# Patient Record
Sex: MALE | Race: WHITE | URBAN - METROPOLITAN AREA
[De-identification: names, ages, dates, MRNs, and addresses within clinical notes are randomized per-mention and may not be internally consistent; named-entity substitution may affect disease eponyms.]

---

## 2017-01-22 ENCOUNTER — EMERGENCY (EMERGENCY)
Facility: HOSPITAL | Age: 22
LOS: 0 days | Discharge: HOME | End: 2017-01-23
Admitting: FAMILY MEDICINE
Payer: COMMERCIAL

## 2017-01-22 DIAGNOSIS — M25.541 PAIN IN JOINTS OF RIGHT HAND: ICD-10-CM
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
## 2017-01-22 DIAGNOSIS — S62.636A DISPLACED FRACTURE OF DISTAL PHALANX OF RIGHT LITTLE FINGER, INITIAL ENCOUNTER FOR CLOSED FRACTURE: ICD-10-CM

## 2017-01-22 PROCEDURE — 73090 X-RAY EXAM OF FOREARM: CPT | Mod: 26,RT

## 2017-01-22 PROCEDURE — 73130 X-RAY EXAM OF HAND: CPT | Mod: 26,RT

## 2017-01-22 PROCEDURE — 99284 EMERGENCY DEPT VISIT MOD MDM: CPT | Mod: 25

## 2017-01-22 PROCEDURE — 29125 APPL SHORT ARM SPLINT STATIC: CPT | Mod: RT

## 2017-01-29 ENCOUNTER — EMERGENCY (EMERGENCY)
Facility: HOSPITAL | Age: 22
LOS: 1 days | Discharge: HOME | End: 2017-01-29
Attending: EMERGENCY MEDICINE | Admitting: EMERGENCY MEDICINE
Payer: COMMERCIAL

## 2017-01-29 VITALS
SYSTOLIC BLOOD PRESSURE: 105 MMHG | TEMPERATURE: 98 F | RESPIRATION RATE: 18 BRPM | HEART RATE: 94 BPM | OXYGEN SATURATION: 100 % | DIASTOLIC BLOOD PRESSURE: 63 MMHG

## 2017-01-29 PROCEDURE — 73130 X-RAY EXAM OF HAND: CPT | Mod: 26,RT

## 2017-01-29 PROCEDURE — 29125 APPL SHORT ARM SPLINT STATIC: CPT | Mod: RT

## 2017-01-29 PROCEDURE — 99283 EMERGENCY DEPT VISIT LOW MDM: CPT | Mod: 25

## 2017-01-29 RX ORDER — IBUPROFEN 200 MG
600 TABLET ORAL ONCE
Qty: 0 | Refills: 0 | Status: COMPLETED | OUTPATIENT
Start: 2017-01-29 | End: 2017-01-29

## 2017-01-29 RX ORDER — FAMOTIDINE 10 MG/ML
1 INJECTION INTRAVENOUS
Qty: 20 | Refills: 0 | OUTPATIENT
Start: 2017-01-29

## 2017-01-29 NOTE — ED PROVIDER NOTE - OBJECTIVE STATEMENT
22 yo pt with pain and numbness to right hand.  pt with injury to right hand and found to have possible fracture.  Pt placed in cast by Dr. Jenkins.  pt now having increased pain right hand and numbness to right 4th and 5th digit.  Pt states had MRI that showed no fracture, but orthopedic doctor wanted to keep cast on.  Nothing makes symptoms better.

## 2017-01-29 NOTE — ED ADULT NURSE NOTE - OBJECTIVE STATEMENT
pt received in results waiting. pt is awake alert oriented following commands and speaking coherently. pt presents to er complaining of right hand pain. pt received in cast by MD boone. pt has no fracture(mri done) but patient still complaining of pain. no numbness, no deformity. cap refill good. resp even and unlabored. in nad. ortho to see patient. pt injury due to punching wall x 1 week ago

## 2017-01-29 NOTE — ED PROVIDER NOTE - PROGRESS NOTE DETAILS
patient seen by ortho resident Dr. Horne, cast removed, ulnar gutter splint applied by Dr. Horne as per Dr. Todd instructions. Patient feeling better, will dc home

## 2017-01-29 NOTE — CONSULT NOTE ADULT - SUBJECTIVE AND OBJECTIVE BOX
Pt is a R hand dominant 21M presents with tight R ulnar gutter cast. Pt denies numbness tingling paresthesias in affected limb. Pt punched door 1 week ago. XR and MRI negative for fx. Casted by Dr. Todd for comfort d/t complaints of extreme pain.    PMHx: anxiety, depression, ADHD  PSHx: denies  Allergies: bees  Imaging: R hand XR Negative for fx or dislocation    PE:  Gen: NAD, AAOx3  RUE: Cast removed, Skin intact, + TTP over 4th proximal phalanx, SILT C5-T1, motor intact, cap refill brisk, compartments soft, no pain with passive stretch of digits    Procedure: cast removed w/o complication. splinted in ulnar gutter splint in intrinsic plus, tolerated procedure well, n/v status unchanged post splint

## 2017-01-29 NOTE — ED PROVIDER NOTE - MEDICAL DECISION MAKING DETAILS
xrays do not any new fractures,patient seen by ortho resident, case removed, splint applied,. will dc home

## 2017-01-29 NOTE — CONSULT NOTE ADULT - ASSESSMENT
21M w R hand pain and tight cast per pt. Cast removed and splint applied.    analgesia  NWB RUE  Splint for comfort  Keep splint c/d/i  Ortho stable for DC  F/U Dr. Todd as within 1 week  D/w Dr. Todd- agrees as above

## 2017-02-02 DIAGNOSIS — M79.641 PAIN IN RIGHT HAND: ICD-10-CM

## 2017-06-13 ENCOUNTER — EMERGENCY (EMERGENCY)
Facility: HOSPITAL | Age: 22
LOS: 0 days | Discharge: HOME | End: 2017-06-14
Attending: EMERGENCY MEDICINE | Admitting: EMERGENCY MEDICINE
Payer: COMMERCIAL

## 2017-06-13 VITALS — HEIGHT: 67 IN | WEIGHT: 149.91 LBS

## 2017-06-13 PROCEDURE — 71100 X-RAY EXAM RIBS UNI 2 VIEWS: CPT | Mod: 26,RT

## 2017-06-13 PROCEDURE — 99284 EMERGENCY DEPT VISIT MOD MDM: CPT

## 2017-06-13 PROCEDURE — 71020: CPT | Mod: 26

## 2017-06-13 PROCEDURE — 70450 CT HEAD/BRAIN W/O DYE: CPT | Mod: 26

## 2017-06-13 PROCEDURE — 73010 X-RAY EXAM OF SHOULDER BLADE: CPT | Mod: 26,RT

## 2017-06-13 PROCEDURE — 73060 X-RAY EXAM OF HUMERUS: CPT | Mod: 26,RT

## 2017-06-13 RX ORDER — MORPHINE SULFATE 50 MG/1
4 CAPSULE, EXTENDED RELEASE ORAL ONCE
Qty: 0 | Refills: 0 | Status: DISCONTINUED | OUTPATIENT
Start: 2017-06-13 | End: 2017-06-13

## 2017-06-13 RX ORDER — SODIUM CHLORIDE 9 MG/ML
3 INJECTION INTRAMUSCULAR; INTRAVENOUS; SUBCUTANEOUS EVERY 8 HOURS
Qty: 0 | Refills: 0 | Status: DISCONTINUED | OUTPATIENT
Start: 2017-06-13 | End: 2017-06-14

## 2017-06-13 RX ORDER — ACETAMINOPHEN 500 MG
1000 TABLET ORAL ONCE
Qty: 0 | Refills: 0 | Status: COMPLETED | OUTPATIENT
Start: 2017-06-13 | End: 2017-06-13

## 2017-06-13 RX ORDER — ONDANSETRON 8 MG/1
4 TABLET, FILM COATED ORAL ONCE
Qty: 0 | Refills: 0 | Status: COMPLETED | OUTPATIENT
Start: 2017-06-13 | End: 2017-06-13

## 2017-06-13 RX ADMIN — Medication 1000 MILLIGRAM(S): at 22:12

## 2017-06-13 NOTE — ED STATDOCS - ATTENDING CONTRIBUTION TO CARE
I, Angela Mckeon MD,  performed the initial face to face bedside interview with this patient regarding history of present illness, review of symptoms and relevant past medical, social and family history.  I completed an independent physical examination.  I was the initial provider who evaluated this patient. I have signed out the follow up of any pending tests (i.e. labs, radiological studies) to the ACP.  I have communicated the patient’s plan of care and disposition with the ACP.  The history, relevant review of systems, past medical and surgical history, medical decision making, and physical examination was documented by the scribe in my presence and I attest to the accuracy of the documentation.

## 2017-06-13 NOTE — ED STATDOCS - MUSCULOSKELETAL, MLM
TTP right scapula, right humerus, no head trauma noted. Skin intact, no stepoff at shoulder. Distally NVI.

## 2017-06-13 NOTE — ED ADULT NURSE NOTE - OBJECTIVE STATEMENT
Pt presented to ED S/P fall. as per pt, pt slipped and fell down on wet floor and land on the right side. Denies LOC C/O pain to right UE, right flank. No other complains noted at this time.

## 2017-06-13 NOTE — ED STATDOCS - PROGRESS NOTE DETAILS
GEORGIANA Aldridge:   Patient has been seen, evaluated and orders have been written by the attending in intake. Patient is stable.  I will follow up the results of orders written and I will continue to evaluate/observe the patient. Pt. re-evaluation shows RUQ TTP.  Moved to [facility] for further evaluation.   Ruthy Aldridge PA-C

## 2017-06-13 NOTE — ED ADULT NURSE NOTE - CHPI ED SYMPTOMS NEG
no chills/no nausea/no numbness/no tingling/no weakness/no fever/no decreased eating/drinking/no vomiting/no dizziness
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
## 2017-06-13 NOTE — ED ADULT NURSE REASSESSMENT NOTE - NS ED NURSE REASSESS COMMENT FT1
pt back from xray and CT. mother at bedside. tylenol given per md order. pt c/o 9/10 back, neck, right side pain and headache.

## 2017-06-13 NOTE — ED STATDOCS - NS ED MD SCRIBE ATTENDING SCRIBE SECTIONS
PHYSICAL EXAM/HISTORY OF PRESENT ILLNESS/REVIEW OF SYSTEMS/RESULTS/PAST MEDICAL/SURGICAL/SOCIAL HISTORY/PROGRESS NOTE/DISPOSITION

## 2017-06-13 NOTE — ED ADULT TRIAGE NOTE - CHIEF COMPLAINT QUOTE
slip and fall in bathroom at work at 5:30pm. pt able to continue to work after but presents to ED after work with complaints of right "side" pain, lower back pain, neck pain and head pain. pt has hx of lower back injury in the past. pt takes klonopin, ritalin, valium and keppra, denies blood thinner use. -LOC

## 2017-06-14 VITALS
OXYGEN SATURATION: 96 % | TEMPERATURE: 98 F | SYSTOLIC BLOOD PRESSURE: 129 MMHG | DIASTOLIC BLOOD PRESSURE: 87 MMHG | HEART RATE: 92 BPM | RESPIRATION RATE: 16 BRPM

## 2017-06-14 LAB
ALBUMIN SERPL ELPH-MCNC: 4.1 G/DL — SIGNIFICANT CHANGE UP (ref 3.3–5)
ALP SERPL-CCNC: 59 U/L — SIGNIFICANT CHANGE UP (ref 40–120)
ALT FLD-CCNC: 19 U/L — SIGNIFICANT CHANGE UP (ref 12–78)
ANION GAP SERPL CALC-SCNC: 7 MMOL/L — SIGNIFICANT CHANGE UP (ref 5–17)
APPEARANCE UR: CLEAR — SIGNIFICANT CHANGE UP
APTT BLD: 29.2 SEC — SIGNIFICANT CHANGE UP (ref 27.5–37.4)
AST SERPL-CCNC: 24 U/L — SIGNIFICANT CHANGE UP (ref 15–37)
BACTERIA # UR AUTO: (no result)
BASOPHILS # BLD AUTO: 0.1 K/UL — SIGNIFICANT CHANGE UP (ref 0–0.2)
BASOPHILS NFR BLD AUTO: 1.4 % — SIGNIFICANT CHANGE UP (ref 0–2)
BILIRUB SERPL-MCNC: 1 MG/DL — SIGNIFICANT CHANGE UP (ref 0.2–1.2)
BILIRUB UR-MCNC: NEGATIVE — SIGNIFICANT CHANGE UP
BUN SERPL-MCNC: 14 MG/DL — SIGNIFICANT CHANGE UP (ref 7–23)
CALCIUM SERPL-MCNC: 8.8 MG/DL — SIGNIFICANT CHANGE UP (ref 8.5–10.1)
CHLORIDE SERPL-SCNC: 107 MMOL/L — SIGNIFICANT CHANGE UP (ref 96–108)
CO2 SERPL-SCNC: 25 MMOL/L — SIGNIFICANT CHANGE UP (ref 22–31)
COLOR SPEC: YELLOW — SIGNIFICANT CHANGE UP
CREAT SERPL-MCNC: 0.84 MG/DL — SIGNIFICANT CHANGE UP (ref 0.5–1.3)
DIFF PNL FLD: NEGATIVE — SIGNIFICANT CHANGE UP
EOSINOPHIL # BLD AUTO: 0 K/UL — SIGNIFICANT CHANGE UP (ref 0–0.5)
EOSINOPHIL NFR BLD AUTO: 0.6 % — SIGNIFICANT CHANGE UP (ref 0–6)
EPI CELLS # UR: SIGNIFICANT CHANGE UP
GLUCOSE SERPL-MCNC: 78 MG/DL — SIGNIFICANT CHANGE UP (ref 70–99)
GLUCOSE UR QL: NEGATIVE MG/DL — SIGNIFICANT CHANGE UP
HCT VFR BLD CALC: 40.8 % — SIGNIFICANT CHANGE UP (ref 39–50)
HGB BLD-MCNC: 14.2 G/DL — SIGNIFICANT CHANGE UP (ref 13–17)
INR BLD: 1.08 RATIO — SIGNIFICANT CHANGE UP (ref 0.88–1.16)
KETONES UR-MCNC: (no result)
LEUKOCYTE ESTERASE UR-ACNC: NEGATIVE — SIGNIFICANT CHANGE UP
LIDOCAIN IGE QN: 75 U/L — SIGNIFICANT CHANGE UP (ref 73–393)
LYMPHOCYTES # BLD AUTO: 2.9 K/UL — SIGNIFICANT CHANGE UP (ref 1–3.3)
LYMPHOCYTES # BLD AUTO: 38.1 % — SIGNIFICANT CHANGE UP (ref 13–44)
MCHC RBC-ENTMCNC: 29.8 PG — SIGNIFICANT CHANGE UP (ref 27–34)
MCHC RBC-ENTMCNC: 34.8 GM/DL — SIGNIFICANT CHANGE UP (ref 32–36)
MCV RBC AUTO: 85.7 FL — SIGNIFICANT CHANGE UP (ref 80–100)
MONOCYTES # BLD AUTO: 0.6 K/UL — SIGNIFICANT CHANGE UP (ref 0–0.9)
MONOCYTES NFR BLD AUTO: 7.5 % — SIGNIFICANT CHANGE UP (ref 2–14)
NEUTROPHILS # BLD AUTO: 3.9 K/UL — SIGNIFICANT CHANGE UP (ref 1.8–7.4)
NEUTROPHILS NFR BLD AUTO: 52.3 % — SIGNIFICANT CHANGE UP (ref 43–77)
NITRITE UR-MCNC: NEGATIVE — SIGNIFICANT CHANGE UP
PH UR: 6 — SIGNIFICANT CHANGE UP (ref 5–8)
PLATELET # BLD AUTO: 209 K/UL — SIGNIFICANT CHANGE UP (ref 150–400)
POTASSIUM SERPL-MCNC: 3.8 MMOL/L — SIGNIFICANT CHANGE UP (ref 3.5–5.3)
POTASSIUM SERPL-SCNC: 3.8 MMOL/L — SIGNIFICANT CHANGE UP (ref 3.5–5.3)
PROT SERPL-MCNC: 6.6 GM/DL — SIGNIFICANT CHANGE UP (ref 6–8.3)
PROT UR-MCNC: 30 MG/DL
PROTHROM AB SERPL-ACNC: 11.7 SEC — SIGNIFICANT CHANGE UP (ref 9.8–12.7)
RBC # BLD: 4.76 M/UL — SIGNIFICANT CHANGE UP (ref 4.2–5.8)
RBC # FLD: 11.1 % — SIGNIFICANT CHANGE UP (ref 10.3–14.5)
RBC CASTS # UR COMP ASSIST: (no result) /HPF (ref 0–4)
SODIUM SERPL-SCNC: 139 MMOL/L — SIGNIFICANT CHANGE UP (ref 135–145)
SP GR SPEC: 1.02 — SIGNIFICANT CHANGE UP (ref 1.01–1.02)
UROBILINOGEN FLD QL: 4 MG/DL
WBC # BLD: 7.5 K/UL — SIGNIFICANT CHANGE UP (ref 3.8–10.5)
WBC # FLD AUTO: 7.5 K/UL — SIGNIFICANT CHANGE UP (ref 3.8–10.5)
WBC UR QL: (no result)

## 2017-06-14 PROCEDURE — 74177 CT ABD & PELVIS W/CONTRAST: CPT | Mod: 26

## 2017-06-14 RX ORDER — KETOROLAC TROMETHAMINE 30 MG/ML
30 SYRINGE (ML) INJECTION ONCE
Qty: 0 | Refills: 0 | Status: DISCONTINUED | OUTPATIENT
Start: 2017-06-14 | End: 2017-06-14

## 2017-06-14 RX ADMIN — MORPHINE SULFATE 4 MILLIGRAM(S): 50 CAPSULE, EXTENDED RELEASE ORAL at 00:21

## 2017-06-14 RX ADMIN — MORPHINE SULFATE 4 MILLIGRAM(S): 50 CAPSULE, EXTENDED RELEASE ORAL at 00:57

## 2017-06-14 RX ADMIN — Medication 30 MILLIGRAM(S): at 03:47

## 2017-06-14 RX ADMIN — SODIUM CHLORIDE 3 MILLILITER(S): 9 INJECTION INTRAMUSCULAR; INTRAVENOUS; SUBCUTANEOUS at 00:05

## 2017-06-14 RX ADMIN — ONDANSETRON 4 MILLIGRAM(S): 8 TABLET, FILM COATED ORAL at 00:21
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
## 2017-06-15 DIAGNOSIS — M54.9 DORSALGIA, UNSPECIFIED: ICD-10-CM

## 2017-06-15 DIAGNOSIS — S09.90XA UNSPECIFIED INJURY OF HEAD, INITIAL ENCOUNTER: ICD-10-CM

## 2017-06-15 DIAGNOSIS — M54.2 CERVICALGIA: ICD-10-CM
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
## 2017-06-15 DIAGNOSIS — S30.0XXA CONTUSION OF LOWER BACK AND PELVIS, INITIAL ENCOUNTER: ICD-10-CM
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
## 2017-07-19 ENCOUNTER — EMERGENCY (EMERGENCY)
Facility: HOSPITAL | Age: 22
LOS: 0 days | Discharge: HOME | End: 2017-07-19
Attending: EMERGENCY MEDICINE | Admitting: EMERGENCY MEDICINE
Payer: COMMERCIAL

## 2017-07-19 VITALS
DIASTOLIC BLOOD PRESSURE: 71 MMHG | TEMPERATURE: 98 F | OXYGEN SATURATION: 100 % | RESPIRATION RATE: 16 BRPM | SYSTOLIC BLOOD PRESSURE: 120 MMHG | HEART RATE: 80 BPM

## 2017-07-19 VITALS — HEIGHT: 72 IN | WEIGHT: 132.06 LBS
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
## 2017-07-19 DIAGNOSIS — S49.92XA UNSPECIFIED INJURY OF LEFT SHOULDER AND UPPER ARM, INITIAL ENCOUNTER: ICD-10-CM
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
## 2017-07-19 DIAGNOSIS — M25.512 PAIN IN LEFT SHOULDER: ICD-10-CM

## 2017-07-19 PROCEDURE — 29240 STRAPPING OF SHOULDER: CPT | Mod: LT

## 2017-07-19 PROCEDURE — 99284 EMERGENCY DEPT VISIT MOD MDM: CPT

## 2017-07-19 PROCEDURE — 73030 X-RAY EXAM OF SHOULDER: CPT | Mod: 26,LT

## 2017-07-19 NOTE — ED ADULT TRIAGE NOTE - CHIEF COMPLAINT QUOTE
left shoulder pain. states shoulder dislocated last night with movement and "popped" back in. denies numbness at this time. good cap refill, +radial pulse, decreased ROM due to pain

## 2017-07-19 NOTE — ED STATDOCS - ATTENDING CONTRIBUTION TO CARE
I performed the initial face to face bedside interview with this patient regarding history of present illness, review of symptoms and past medical, social and family history.  I completed an independent physical examination.  I was the initial provider who evaluated this patient.  The history, review of symptoms and examination was documented by the scribe in my presence and I attest to the accuracy of the documentation.  I have signed out the follow up of any pending tests (i.e. labs, radiological studies) to the ACP.  I have discussed the patient’s plan of care and disposition with the ACP

## 2017-07-19 NOTE — ED STATDOCS - CARE PLAN
Principal Discharge DX:	Shoulder injury, left, sequela  Secondary Diagnosis:	Acute pain of left shoulder

## 2017-07-19 NOTE — ED STATDOCS - MEDICAL DECISION MAKING DETAILS
23 y/o male with shoulder pain s/p shoulder dislocation. Will X-ray, sling and ortho f/u as outpt. 23 y/o male with shoulder pain s/p shoulder dislocation. Will X-ray, sling and ortho f/u as outpt. NV intact. no deformities.

## 2017-07-19 NOTE — ED STATDOCS - OBJECTIVE STATEMENT
21 y/o male presents to the ED for shoulder pain with onset in the past few days. Patient c/o shoulder pain since shoulder dislocation in the past 3 weeks. [facility] [place] [facility]/p [place] accident, did not need medical relocation had X-ray at  that time. Reports another episode of shoulder dislocation and relocation one day ago when throwing a bug. Took ibuprofen for pain. Denies fever, chills, leg pain, abd pain, any other complaints. 21 y/o male presents to the ED for shoulder pain with onset in the past few days. Patient c/o shoulder pain since shoulder dislocation in the past 3 weeks. Was seen at [facility] [place] s/p skateboarding accident, did not need medical reduction as it had already spontaneously reduced prior to presentation. had X-ray at  that time which was unremarkable per patient. Reports another episode of shoulder dislocation and relocation one day ago when throwing a bug. Took ibuprofen for pain. Denies fever, chills, leg pain, abd pain, any other complaints. No weakness or numbness in arm. No history of surgeries to arm. No other arm or shoulder trauma. No chest, neck, back symptoms.

## 2017-07-19 NOTE — ED STATDOCS - PROGRESS NOTE DETAILS
21 yo male presents with left shoulder pain s/p falling skateboarding a few days ago. Was told that it may have been dislocated. Was placed oin a slign but pt refused to wear it and felt it pop out again. Xray. AJM: xray unremarkable. NV intact. stable for dc home with sling and ortho followup

## 2017-09-14 RX ORDER — ONDANSETRON 8 MG/1
4 TABLET, FILM COATED ORAL EVERY 6 HOURS
Qty: 0 | Refills: 0 | Status: DISCONTINUED | OUTPATIENT
Start: 2017-09-15 | End: 2017-09-15

## 2017-09-14 RX ORDER — OXYCODONE HYDROCHLORIDE 5 MG/1
5 TABLET ORAL EVERY 4 HOURS
Qty: 0 | Refills: 0 | Status: DISCONTINUED | OUTPATIENT
Start: 2017-09-15 | End: 2017-09-15

## 2017-09-14 RX ORDER — FAMOTIDINE 10 MG/ML
20 INJECTION INTRAVENOUS ONCE
Qty: 0 | Refills: 0 | Status: COMPLETED | OUTPATIENT
Start: 2017-09-15 | End: 2017-09-15

## 2017-09-14 RX ORDER — ACETAMINOPHEN 500 MG
975 TABLET ORAL ONCE
Qty: 0 | Refills: 0 | Status: COMPLETED | OUTPATIENT
Start: 2017-09-15 | End: 2017-09-15

## 2017-09-15 ENCOUNTER — OUTPATIENT (OUTPATIENT)
Dept: OUTPATIENT SERVICES | Facility: HOSPITAL | Age: 22
LOS: 1 days | Discharge: HOME | End: 2017-09-15
Payer: COMMERCIAL

## 2017-09-15 ENCOUNTER — RESULT REVIEW (OUTPATIENT)
Age: 22
End: 2017-09-15

## 2017-09-15 VITALS
DIASTOLIC BLOOD PRESSURE: 78 MMHG | HEART RATE: 62 BPM | OXYGEN SATURATION: 98 % | RESPIRATION RATE: 14 BRPM | TEMPERATURE: 98 F | SYSTOLIC BLOOD PRESSURE: 121 MMHG

## 2017-09-15 VITALS
TEMPERATURE: 98 F | SYSTOLIC BLOOD PRESSURE: 116 MMHG | RESPIRATION RATE: 16 BRPM | DIASTOLIC BLOOD PRESSURE: 73 MMHG | HEART RATE: 62 BPM | OXYGEN SATURATION: 97 %

## 2017-09-15 DIAGNOSIS — Z98.890 OTHER SPECIFIED POSTPROCEDURAL STATES: Chronic | ICD-10-CM

## 2017-09-15 PROCEDURE — 88304 TISSUE EXAM BY PATHOLOGIST: CPT | Mod: 26

## 2017-09-15 RX ORDER — MEPERIDINE HYDROCHLORIDE 50 MG/ML
12.5 INJECTION INTRAMUSCULAR; INTRAVENOUS; SUBCUTANEOUS
Qty: 0 | Refills: 0 | Status: DISCONTINUED | OUTPATIENT
Start: 2017-09-15 | End: 2017-09-15

## 2017-09-15 RX ORDER — OXYCODONE HYDROCHLORIDE 5 MG/1
10 TABLET ORAL ONCE
Qty: 0 | Refills: 0 | Status: COMPLETED | OUTPATIENT
Start: 2017-09-15 | End: 2017-09-15

## 2017-09-15 RX ORDER — SODIUM CHLORIDE 9 MG/ML
1000 INJECTION, SOLUTION INTRAVENOUS
Qty: 0 | Refills: 0 | Status: DISCONTINUED | OUTPATIENT
Start: 2017-09-15 | End: 2017-09-15

## 2017-09-15 RX ORDER — FENTANYL CITRATE 50 UG/ML
50 INJECTION INTRAVENOUS
Qty: 0 | Refills: 0 | Status: DISCONTINUED | OUTPATIENT
Start: 2017-09-15 | End: 2017-09-15

## 2017-09-15 RX ORDER — SODIUM CHLORIDE 9 MG/ML
3 INJECTION INTRAMUSCULAR; INTRAVENOUS; SUBCUTANEOUS EVERY 8 HOURS
Qty: 0 | Refills: 0 | Status: DISCONTINUED | OUTPATIENT
Start: 2017-09-15 | End: 2017-09-15

## 2017-09-15 RX ADMIN — OXYCODONE HYDROCHLORIDE 5 MILLIGRAM(S): 5 TABLET ORAL at 11:44

## 2017-09-15 RX ADMIN — ONDANSETRON 4 MILLIGRAM(S): 8 TABLET, FILM COATED ORAL at 11:14

## 2017-09-15 RX ADMIN — Medication 975 MILLIGRAM(S): at 09:12

## 2017-09-15 RX ADMIN — FAMOTIDINE 20 MILLIGRAM(S): 10 INJECTION INTRAVENOUS at 09:13

## 2017-09-15 NOTE — ASU DISCHARGE PLAN (ADULT/PEDIATRIC). - MEDICATION SUMMARY - MEDICATIONS TO TAKE
I will START or STAY ON the medications listed below when I get home from the hospital:    traMADol 50 mg oral tablet  -- 1 tab(s) by mouth every 4 hours  -- Indication: For per pmd    oxyCODONE-acetaminophen 5 mg-325 mg oral tablet  -- 1 tab(s) by mouth every 6 hours, As Needed  -for severe pain MDD:6   -- Caution federal law prohibits the transfer of this drug to any person other  than the person for whom it was prescribed.  May cause drowsiness.  Alcohol may intensify this effect.  Use care when operating dangerous machinery.  This prescription cannot be refilled.  This product contains acetaminophen.  Do not use  with any other product containing acetaminophen to prevent possible liver damage.  Using more of this medication than prescribed may cause serious breathing problems.    -- Indication: For prn severe pain    Valium  --  by mouth   -- Indication: For per pmd    Keppra  --  by mouth   -- Indication: For per pmd    KlonoPIN  --  by mouth   -- Indication: For per pmd    Ritalin  --  by mouth   -- Indication: For per pmd

## 2017-09-15 NOTE — BRIEF OPERATIVE NOTE - PROCEDURE
<<-----Click on this checkbox to enter Procedure Labral repair of left shoulder  09/15/2017    Active  NFRANE

## 2017-09-18 LAB — SURGICAL PATHOLOGY FINAL REPORT - CH: SIGNIFICANT CHANGE UP

## 2017-09-19 DIAGNOSIS — Z87.891 PERSONAL HISTORY OF NICOTINE DEPENDENCE: ICD-10-CM

## 2017-09-19 DIAGNOSIS — M24.412 RECURRENT DISLOCATION, LEFT SHOULDER: ICD-10-CM

## 2017-09-19 DIAGNOSIS — F41.9 ANXIETY DISORDER, UNSPECIFIED: ICD-10-CM

## 2017-09-19 DIAGNOSIS — G40.909 EPILEPSY, UNSPECIFIED, NOT INTRACTABLE, WITHOUT STATUS EPILEPTICUS: ICD-10-CM

## 2017-09-20 ENCOUNTER — EMERGENCY (EMERGENCY)
Facility: HOSPITAL | Age: 22
LOS: 0 days | Discharge: HOME | End: 2017-09-20
Attending: EMERGENCY MEDICINE | Admitting: EMERGENCY MEDICINE
Payer: COMMERCIAL

## 2017-09-20 VITALS
DIASTOLIC BLOOD PRESSURE: 91 MMHG | WEIGHT: 130.07 LBS | RESPIRATION RATE: 17 BRPM | HEIGHT: 72 IN | SYSTOLIC BLOOD PRESSURE: 125 MMHG | HEART RATE: 74 BPM | OXYGEN SATURATION: 98 % | TEMPERATURE: 98 F

## 2017-09-20 VITALS
OXYGEN SATURATION: 100 % | SYSTOLIC BLOOD PRESSURE: 120 MMHG | TEMPERATURE: 98 F | DIASTOLIC BLOOD PRESSURE: 73 MMHG | HEART RATE: 64 BPM | RESPIRATION RATE: 16 BRPM

## 2017-09-20 DIAGNOSIS — Z98.890 OTHER SPECIFIED POSTPROCEDURAL STATES: Chronic | ICD-10-CM

## 2017-09-20 PROCEDURE — 99284 EMERGENCY DEPT VISIT MOD MDM: CPT

## 2017-09-20 RX ORDER — ONDANSETRON 8 MG/1
8 TABLET, FILM COATED ORAL ONCE
Qty: 0 | Refills: 0 | Status: COMPLETED | OUTPATIENT
Start: 2017-09-20 | End: 2017-09-20

## 2017-09-20 RX ORDER — OXYCODONE HYDROCHLORIDE 5 MG/1
5 TABLET ORAL ONCE
Qty: 0 | Refills: 0 | Status: DISCONTINUED | OUTPATIENT
Start: 2017-09-20 | End: 2017-09-20

## 2017-09-20 RX ORDER — KETOROLAC TROMETHAMINE 30 MG/ML
60 SYRINGE (ML) INJECTION ONCE
Qty: 0 | Refills: 0 | Status: DISCONTINUED | OUTPATIENT
Start: 2017-09-20 | End: 2017-09-20

## 2017-09-20 RX ORDER — ONDANSETRON 8 MG/1
1 TABLET, FILM COATED ORAL
Qty: 15 | Refills: 0 | OUTPATIENT
Start: 2017-09-20 | End: 2017-09-25

## 2017-09-20 RX ADMIN — OXYCODONE HYDROCHLORIDE 5 MILLIGRAM(S): 5 TABLET ORAL at 03:24

## 2017-09-20 RX ADMIN — Medication 60 MILLIGRAM(S): at 03:06

## 2017-09-20 RX ADMIN — OXYCODONE HYDROCHLORIDE 5 MILLIGRAM(S): 5 TABLET ORAL at 03:06

## 2017-09-20 RX ADMIN — ONDANSETRON 8 MILLIGRAM(S): 8 TABLET, FILM COATED ORAL at 02:43

## 2017-09-20 RX ADMIN — Medication 60 MILLIGRAM(S): at 02:44

## 2017-09-20 NOTE — ED PROVIDER NOTE - MUSCULOSKELETAL, MLM
Spine appears normal, Lt arm in sling with clean dressed wounds to shoulder area.  Distal NVI .  NL sensation over axillary distribution.

## 2017-09-20 NOTE — ED PROVIDER NOTE - OBJECTIVE STATEMENT
22 M on keppra for epilepsy and Klonopin for anxiety presents with nausea.  pt states that he is in pain sec to recent Lt shoulder sx (labral tear) and that he has been vomiting since being on percocet and vicodin.  No fevers, no co HA, dizziness, cp, sob, abd pain, nvd or urinary co.

## 2017-09-20 NOTE — ED PROVIDER NOTE - MEDICAL DECISION MAKING DETAILS
Pt with vomiting sec to opioid ingestion s/p shoulder sx.  exam ow NL.  Pt will be stable for DC with rx for zofran and will need to follow up with PMD/sx later today.

## 2017-09-20 NOTE — ED ADULT TRIAGE NOTE - CHIEF COMPLAINT QUOTE
nausea and vomiting x 12 hours. Left shoulder dislocation surgically repaired by Dr. Hernandez 9/15. Was taking percocet for pain, but has not taken in 12 hours. Increased pain.

## 2017-09-20 NOTE — ED PROVIDER NOTE - PROGRESS NOTE DETAILS
Reevaluated patient at bedside.  Patient feeling much improved.    An opportunity to ask questions was given.  Discussed the importance of prompt, close medical follow-up.  Patient will return with any changes, concerns or persistent / worsening symptoms.  Understanding of all instructions verbalized.

## 2017-09-21 DIAGNOSIS — R11.10 VOMITING, UNSPECIFIED: ICD-10-CM

## 2017-10-13 ENCOUNTER — EMERGENCY (EMERGENCY)
Facility: HOSPITAL | Age: 22
LOS: 0 days | Discharge: HOME | End: 2017-10-14
Attending: EMERGENCY MEDICINE | Admitting: EMERGENCY MEDICINE
Payer: COMMERCIAL

## 2017-10-13 VITALS
SYSTOLIC BLOOD PRESSURE: 123 MMHG | TEMPERATURE: 98 F | DIASTOLIC BLOOD PRESSURE: 71 MMHG | OXYGEN SATURATION: 100 % | HEIGHT: 60 IN | RESPIRATION RATE: 18 BRPM | WEIGHT: 130.07 LBS | HEART RATE: 98 BPM

## 2017-10-13 DIAGNOSIS — Z98.890 OTHER SPECIFIED POSTPROCEDURAL STATES: Chronic | ICD-10-CM

## 2017-10-13 DIAGNOSIS — G40.909 EPILEPSY, UNSPECIFIED, NOT INTRACTABLE, WITHOUT STATUS EPILEPTICUS: ICD-10-CM

## 2017-10-13 PROCEDURE — 99284 EMERGENCY DEPT VISIT MOD MDM: CPT

## 2017-10-14 VITALS
TEMPERATURE: 98 F | RESPIRATION RATE: 16 BRPM | OXYGEN SATURATION: 100 % | SYSTOLIC BLOOD PRESSURE: 119 MMHG | HEART RATE: 87 BPM | DIASTOLIC BLOOD PRESSURE: 73 MMHG

## 2017-10-14 LAB
ALBUMIN SERPL ELPH-MCNC: 4.5 G/DL — SIGNIFICANT CHANGE UP (ref 3.3–5)
ALP SERPL-CCNC: 62 U/L — SIGNIFICANT CHANGE UP (ref 40–120)
ALT FLD-CCNC: 25 U/L — SIGNIFICANT CHANGE UP (ref 12–78)
ANION GAP SERPL CALC-SCNC: 6 MMOL/L — SIGNIFICANT CHANGE UP (ref 5–17)
AST SERPL-CCNC: 21 U/L — SIGNIFICANT CHANGE UP (ref 15–37)
BASOPHILS # BLD AUTO: 0.1 K/UL — SIGNIFICANT CHANGE UP (ref 0–0.2)
BASOPHILS NFR BLD AUTO: 0.7 % — SIGNIFICANT CHANGE UP (ref 0–2)
BILIRUB SERPL-MCNC: 0.6 MG/DL — SIGNIFICANT CHANGE UP (ref 0.2–1.2)
BUN SERPL-MCNC: 12 MG/DL — SIGNIFICANT CHANGE UP (ref 7–23)
CALCIUM SERPL-MCNC: 9.3 MG/DL — SIGNIFICANT CHANGE UP (ref 8.5–10.1)
CHLORIDE SERPL-SCNC: 102 MMOL/L — SIGNIFICANT CHANGE UP (ref 96–108)
CO2 SERPL-SCNC: 30 MMOL/L — SIGNIFICANT CHANGE UP (ref 22–31)
CREAT SERPL-MCNC: 0.85 MG/DL — SIGNIFICANT CHANGE UP (ref 0.5–1.3)
EOSINOPHIL # BLD AUTO: 0 K/UL — SIGNIFICANT CHANGE UP (ref 0–0.5)
EOSINOPHIL NFR BLD AUTO: 0.2 % — SIGNIFICANT CHANGE UP (ref 0–6)
GLUCOSE SERPL-MCNC: 80 MG/DL — SIGNIFICANT CHANGE UP (ref 70–99)
HCT VFR BLD CALC: 45.8 % — SIGNIFICANT CHANGE UP (ref 39–50)
HGB BLD-MCNC: 15.8 G/DL — SIGNIFICANT CHANGE UP (ref 13–17)
LYMPHOCYTES # BLD AUTO: 1.2 K/UL — SIGNIFICANT CHANGE UP (ref 1–3.3)
LYMPHOCYTES # BLD AUTO: 8.9 % — LOW (ref 13–44)
MCHC RBC-ENTMCNC: 30.1 PG — SIGNIFICANT CHANGE UP (ref 27–34)
MCHC RBC-ENTMCNC: 34.6 GM/DL — SIGNIFICANT CHANGE UP (ref 32–36)
MCV RBC AUTO: 87 FL — SIGNIFICANT CHANGE UP (ref 80–100)
MONOCYTES # BLD AUTO: 0.9 K/UL — SIGNIFICANT CHANGE UP (ref 0–0.9)
MONOCYTES NFR BLD AUTO: 6.8 % — SIGNIFICANT CHANGE UP (ref 2–14)
NEUTROPHILS # BLD AUTO: 11.5 K/UL — HIGH (ref 1.8–7.4)
NEUTROPHILS NFR BLD AUTO: 83.3 % — HIGH (ref 43–77)
PLATELET # BLD AUTO: 246 K/UL — SIGNIFICANT CHANGE UP (ref 150–400)
POTASSIUM SERPL-MCNC: 3.7 MMOL/L — SIGNIFICANT CHANGE UP (ref 3.5–5.3)
POTASSIUM SERPL-SCNC: 3.7 MMOL/L — SIGNIFICANT CHANGE UP (ref 3.5–5.3)
PROT SERPL-MCNC: 7.6 GM/DL — SIGNIFICANT CHANGE UP (ref 6–8.3)
RBC # BLD: 5.26 M/UL — SIGNIFICANT CHANGE UP (ref 4.2–5.8)
RBC # FLD: 11.4 % — SIGNIFICANT CHANGE UP (ref 10.3–14.5)
SODIUM SERPL-SCNC: 138 MMOL/L — SIGNIFICANT CHANGE UP (ref 135–145)
WBC # BLD: 13.8 K/UL — HIGH (ref 3.8–10.5)
WBC # FLD AUTO: 13.8 K/UL — HIGH (ref 3.8–10.5)

## 2017-10-14 RX ORDER — ONDANSETRON 8 MG/1
4 TABLET, FILM COATED ORAL ONCE
Qty: 0 | Refills: 0 | Status: COMPLETED | OUTPATIENT
Start: 2017-10-14 | End: 2017-10-14

## 2017-10-14 RX ORDER — OXYCODONE HYDROCHLORIDE 5 MG/1
5 TABLET ORAL ONCE
Qty: 0 | Refills: 0 | Status: DISCONTINUED | OUTPATIENT
Start: 2017-10-14 | End: 2017-10-14

## 2017-10-14 RX ADMIN — ONDANSETRON 4 MILLIGRAM(S): 8 TABLET, FILM COATED ORAL at 01:03

## 2017-10-14 RX ADMIN — OXYCODONE HYDROCHLORIDE 5 MILLIGRAM(S): 5 TABLET ORAL at 01:03

## 2017-10-14 NOTE — ED PROVIDER NOTE - OBJECTIVE STATEMENT
23 y/o M PMHx seizure, on Keppra 500mg am, 750mg pm, Neuro Dr. Pemberton, left shoulder sx by Dr. Hernandez, takes Valium for muscle spasm, Clondipin, ritalin, presents to the ED s/p Seizure. The pt provides that he was attempting to sleep and had a seizure. He noticed after waking up with pain over his tongue s/p bite and blood in his mouth, pain over his left shoulder and left knee and told his mother. Last seizure was 2 years ago, +N, No h/o fever, chills, dizziness, abd pain, vd, cp, cough, sob, rash or urinary incontinence.

## 2017-10-14 NOTE — ED ADULT NURSE NOTE - OBJECTIVE STATEMENT
bite to tongue s/p seizure while trying to sleep, also c/o pain to left shoulder and left knee. last seizure 2 years ago. alert and oriented on arrival. ambulatory with steady gait.

## 2017-10-14 NOTE — ED PROVIDER NOTE - MUSCULOSKELETAL, MLM
Tenderness over left shoulder s/p sx, Spine appears normal, range of motion is not limited, no muscle or joint tenderness

## 2017-10-14 NOTE — ED PROVIDER NOTE - MEDICAL DECISION MAKING DETAILS
21 y/o M s/p sz, to obtain labs, meds, reassess, pt advised to f/u w/ neuro. 21 y/o M s/p sz, to obtain labs, meds, reassess, pt advised to f/u w/ neuro.    Labs WNL.  Pt feeling well.  Shoulder is not dislocated, likely pain from seizure and recent surgery.  If continued pain will need to f/u with orthopedist for reevaluation.  F/u with neurology for possible Keppra adjustment.  Good sleep hygeine, stress relief.  Return precautions given. 23 y/o M s/p sz, to obtain labs, meds, reassess, pt advised to f/u w/ neuro.    Labs WNL.  Back to baseline.  No need for imaging.  Labs WNL.  Okay for d/c home.  F/u with neurology, may need increase in Keppra dosing.     Labs WNL.  Pt feeling well.  Shoulder is not dislocated, likely pain from seizure and recent surgery.  If continued pain will need to f/u with orthopedist for reevaluation.  F/u with neurology for possible Keppra adjustment.  Good sleep hygeine, stress relief.  Return precautions given.

## 2017-10-14 NOTE — ED PROVIDER NOTE - ENMT, MLM
+0.5cm lac s/p tongue bite over left tongue with no active bleed, Airway patent, Nasal mucosa clear. Mouth with normal mucosa. Throat has no vesicles, no oropharyngeal exudates and uvula is midline.

## 2018-02-19 ENCOUNTER — EMERGENCY (EMERGENCY)
Facility: HOSPITAL | Age: 23
LOS: 0 days | Discharge: HOME | End: 2018-02-19
Attending: EMERGENCY MEDICINE | Admitting: EMERGENCY MEDICINE
Payer: COMMERCIAL

## 2018-02-19 VITALS
HEART RATE: 65 BPM | DIASTOLIC BLOOD PRESSURE: 73 MMHG | OXYGEN SATURATION: 98 % | RESPIRATION RATE: 17 BRPM | SYSTOLIC BLOOD PRESSURE: 120 MMHG | TEMPERATURE: 97 F

## 2018-02-19 VITALS — WEIGHT: 125 LBS
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
## 2018-02-19 DIAGNOSIS — G40.909 EPILEPSY, UNSPECIFIED, NOT INTRACTABLE, WITHOUT STATUS EPILEPTICUS: ICD-10-CM

## 2018-02-19 DIAGNOSIS — R60.0 LOCALIZED EDEMA: ICD-10-CM

## 2018-02-19 DIAGNOSIS — S60.221A CONTUSION OF RIGHT HAND, INITIAL ENCOUNTER: ICD-10-CM

## 2018-02-19 DIAGNOSIS — S69.81XA OTHER SPECIFIED INJURIES OF RIGHT WRIST, HAND AND FINGER(S), INITIAL ENCOUNTER: ICD-10-CM

## 2018-02-19 DIAGNOSIS — M79.641 PAIN IN RIGHT HAND: ICD-10-CM

## 2018-02-19 DIAGNOSIS — F98.8 OTHER SPECIFIED BEHAVIORAL AND EMOTIONAL DISORDERS WITH ONSET USUALLY OCCURRING IN CHILDHOOD AND ADOLESCENCE: ICD-10-CM

## 2018-02-19 DIAGNOSIS — Z98.890 OTHER SPECIFIED POSTPROCEDURAL STATES: Chronic | ICD-10-CM

## 2018-02-19 PROCEDURE — 73130 X-RAY EXAM OF HAND: CPT | Mod: 26,RT

## 2018-02-19 PROCEDURE — 99284 EMERGENCY DEPT VISIT MOD MDM: CPT | Mod: 25

## 2018-02-19 PROCEDURE — 29125 APPL SHORT ARM SPLINT STATIC: CPT | Mod: RT

## 2018-02-19 RX ORDER — OXYCODONE AND ACETAMINOPHEN 5; 325 MG/1; MG/1
1 TABLET ORAL ONCE
Qty: 0 | Refills: 0 | Status: DISCONTINUED | OUTPATIENT
Start: 2018-02-19 | End: 2018-02-19

## 2018-02-19 RX ADMIN — OXYCODONE AND ACETAMINOPHEN 1 TABLET(S): 5; 325 TABLET ORAL at 14:35

## 2018-02-19 NOTE — ED STATDOCS - PROGRESS NOTE DETAILS
Porfirio Ellison DO (Attending): Agree with PA note /assessment.  Patient presents w/ right hand pain s/p punched a wall w/ closed fist.  Pain overlying lateral aspect of hand.  Has had similar episodes in past.  Right hand dominant.  NO paresthesias or weakness.    GEN: AAOx3 NAD... HEENT: NCAT, EOMI, PERRLA, TM NL, OP NL, Nares NL... NECK: No TTP, FROM, Supple... RESP: CTA B/L No W/R/R... CV: S1S2 RRR, Pulses +3, Cap Refill <2s... ABD: BS+, NT, ND, Soft, No R/G/R/CVAT... EXT: Right hand w/ swelling and TTP overlying 4th/5th metacarpals, no clear deformity, no ecchymosis, FROM all fingers/wrist.   strength normal.  Pulses/cap refill/sensation normal.  Remainder exam FROM AE, No Edema, No Lesions, Strength 5/5... SKIN: No rashes or lesions... NEURO: CN III-XII in tact, strength and sensation in tact, NL Gait
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
## 2018-02-19 NOTE — ED STATDOCS - CARE PLAN
Principal Discharge DX:	Hand injury, right, initial encounter  Secondary Diagnosis:	Contusion of right hand, initial encounter
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
## 2018-02-19 NOTE — ED STATDOCS - MUSCULOSKELETAL FINDINGS, MLM
Swelling and ttp to the Right 4th and 5th MCP. Decreased ROM with extension and flexion of the right hand. TTP to the ulnar aspect of the R hand.

## 2018-02-19 NOTE — ED STATDOCS - ATTENDING CONTRIBUTION TO CARE
I, Porfirio Ellison DO,  performed the initial face to face bedside interview with this patient regarding history of present illness, review of symptoms and relevant past medical, social and family history.  I completed an independent physical examination.  I was the initial provider who evaluated this patient. I have signed out the follow up of any pending tests (i.e. labs, radiological studies) to the ACP.  I have communicated the patient’s plan of care and disposition with the ACP.

## 2018-05-23 ENCOUNTER — EMERGENCY (EMERGENCY)
Facility: HOSPITAL | Age: 23
LOS: 0 days | Discharge: HOME | End: 2018-05-24
Attending: EMERGENCY MEDICINE | Admitting: EMERGENCY MEDICINE
Payer: COMMERCIAL

## 2018-05-23 VITALS — WEIGHT: 130.07 LBS | HEIGHT: 72 IN

## 2018-05-23 DIAGNOSIS — Z98.890 OTHER SPECIFIED POSTPROCEDURAL STATES: Chronic | ICD-10-CM

## 2018-05-23 PROCEDURE — 73130 X-RAY EXAM OF HAND: CPT | Mod: 26,50

## 2018-05-23 PROCEDURE — 73562 X-RAY EXAM OF KNEE 3: CPT | Mod: 26,50

## 2018-05-23 PROCEDURE — 70450 CT HEAD/BRAIN W/O DYE: CPT | Mod: 26

## 2018-05-23 PROCEDURE — 99284 EMERGENCY DEPT VISIT MOD MDM: CPT | Mod: 25

## 2018-05-23 PROCEDURE — 72125 CT NECK SPINE W/O DYE: CPT | Mod: 26

## 2018-05-23 RX ORDER — OXYCODONE AND ACETAMINOPHEN 5; 325 MG/1; MG/1
1 TABLET ORAL ONCE
Qty: 0 | Refills: 0 | Status: DISCONTINUED | OUTPATIENT
Start: 2018-05-23 | End: 2018-05-23

## 2018-05-23 RX ADMIN — OXYCODONE AND ACETAMINOPHEN 1 TABLET(S): 5; 325 TABLET ORAL at 23:58

## 2018-05-23 NOTE — ED PROVIDER NOTE - ENMT, MLM
Airway patent, Nasal mucosa clear. Mouth with normal mucosa. Throat has no vesicles, no oropharyngeal exudates and uvula is midline.  TMs clear b/l.  No sign of head trauma, no scalp hematomas or abrasions.
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
## 2018-05-23 NOTE — ED PROVIDER NOTE - MUSCULOSKELETAL, MLM
Spine appears normal, range of motion is not limited, normal gait.  Pain and swelling to b/l thenar eminences and b/l knees.

## 2018-05-24 VITALS
SYSTOLIC BLOOD PRESSURE: 121 MMHG | RESPIRATION RATE: 19 BRPM | OXYGEN SATURATION: 98 % | TEMPERATURE: 98 F | HEART RATE: 71 BPM | DIASTOLIC BLOOD PRESSURE: 78 MMHG

## 2018-05-24 DIAGNOSIS — S09.90XA UNSPECIFIED INJURY OF HEAD, INITIAL ENCOUNTER: ICD-10-CM

## 2018-05-24 DIAGNOSIS — S80.212A ABRASION, LEFT KNEE, INITIAL ENCOUNTER: ICD-10-CM

## 2018-05-24 DIAGNOSIS — S60.511A ABRASION OF RIGHT HAND, INITIAL ENCOUNTER: ICD-10-CM

## 2018-05-24 DIAGNOSIS — Z79.899 OTHER LONG TERM (CURRENT) DRUG THERAPY: ICD-10-CM

## 2018-05-24 DIAGNOSIS — M25.569 PAIN IN UNSPECIFIED KNEE: ICD-10-CM
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
## 2018-05-24 DIAGNOSIS — G40.909 EPILEPSY, UNSPECIFIED, NOT INTRACTABLE, WITHOUT STATUS EPILEPTICUS: ICD-10-CM
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
## 2018-05-24 DIAGNOSIS — R51 HEADACHE: ICD-10-CM

## 2018-05-24 DIAGNOSIS — S80.211A ABRASION, RIGHT KNEE, INITIAL ENCOUNTER: ICD-10-CM

## 2018-05-24 DIAGNOSIS — S60.512A ABRASION OF LEFT HAND, INITIAL ENCOUNTER: ICD-10-CM

## 2018-05-24 DIAGNOSIS — F98.8 OTHER SPECIFIED BEHAVIORAL AND EMOTIONAL DISORDERS WITH ONSET USUALLY OCCURRING IN CHILDHOOD AND ADOLESCENCE: ICD-10-CM
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
## 2018-05-24 RX ORDER — ONDANSETRON 8 MG/1
4 TABLET, FILM COATED ORAL ONCE
Qty: 0 | Refills: 0 | Status: COMPLETED | OUTPATIENT
Start: 2018-05-24 | End: 2018-05-24

## 2018-05-24 RX ADMIN — OXYCODONE AND ACETAMINOPHEN 1 TABLET(S): 5; 325 TABLET ORAL at 00:30

## 2018-05-24 RX ADMIN — ONDANSETRON 4 MILLIGRAM(S): 8 TABLET, FILM COATED ORAL at 01:11
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
## 2018-05-24 NOTE — ED ADULT NURSE NOTE - CHPI ED SYMPTOMS NEG
no tingling/no vomiting/no weakness/no dizziness/no fever/no nausea/no chills/no decreased eating/drinking/no numbness

## 2018-08-29 PROBLEM — R56.9 UNSPECIFIED CONVULSIONS: Chronic | Status: ACTIVE | Noted: 2017-09-15

## 2018-08-29 PROBLEM — F98.8 OTHER SPECIFIED BEHAVIORAL AND EMOTIONAL DISORDERS WITH ONSET USUALLY OCCURRING IN CHILDHOOD AND ADOLESCENCE: Chronic | Status: ACTIVE | Noted: 2017-09-15

## 2018-09-05 ENCOUNTER — OUTPATIENT (OUTPATIENT)
Dept: OUTPATIENT SERVICES | Facility: HOSPITAL | Age: 23
LOS: 1 days | Discharge: HOME | End: 2018-09-05
Payer: COMMERCIAL

## 2018-09-05 ENCOUNTER — RESULT REVIEW (OUTPATIENT)
Age: 23
End: 2018-09-05

## 2018-09-05 VITALS
RESPIRATION RATE: 16 BRPM | TEMPERATURE: 98 F | DIASTOLIC BLOOD PRESSURE: 84 MMHG | HEART RATE: 67 BPM | SYSTOLIC BLOOD PRESSURE: 129 MMHG | OXYGEN SATURATION: 100 %

## 2018-09-05 VITALS
HEIGHT: 72 IN | HEART RATE: 57 BPM | TEMPERATURE: 98 F | DIASTOLIC BLOOD PRESSURE: 74 MMHG | WEIGHT: 130.07 LBS | OXYGEN SATURATION: 99 % | RESPIRATION RATE: 16 BRPM | SYSTOLIC BLOOD PRESSURE: 109 MMHG

## 2018-09-05 DIAGNOSIS — Z98.890 OTHER SPECIFIED POSTPROCEDURAL STATES: Chronic | ICD-10-CM

## 2018-09-05 PROCEDURE — 88304 TISSUE EXAM BY PATHOLOGIST: CPT | Mod: 26

## 2018-09-05 RX ORDER — LEVETIRACETAM 250 MG/1
0 TABLET, FILM COATED ORAL
Qty: 0 | Refills: 0 | COMMUNITY

## 2018-09-05 RX ORDER — FENTANYL CITRATE 50 UG/ML
50 INJECTION INTRAVENOUS
Qty: 0 | Refills: 0 | Status: DISCONTINUED | OUTPATIENT
Start: 2018-09-05 | End: 2018-09-05

## 2018-09-05 RX ORDER — CLONAZEPAM 1 MG
0 TABLET ORAL
Qty: 0 | Refills: 0 | COMMUNITY

## 2018-09-05 RX ORDER — OXYCODONE HYDROCHLORIDE 5 MG/1
5 TABLET ORAL ONCE
Qty: 0 | Refills: 0 | Status: DISCONTINUED | OUTPATIENT
Start: 2018-09-05 | End: 2018-09-05

## 2018-09-05 RX ORDER — APREPITANT 80 MG/1
40 CAPSULE ORAL ONCE
Qty: 0 | Refills: 0 | Status: COMPLETED | OUTPATIENT
Start: 2018-09-05 | End: 2018-09-05

## 2018-09-05 RX ORDER — LEVETIRACETAM 250 MG/1
1000 TABLET, FILM COATED ORAL
Qty: 0 | Refills: 0 | COMMUNITY

## 2018-09-05 RX ORDER — ONDANSETRON 8 MG/1
4 TABLET, FILM COATED ORAL ONCE
Qty: 0 | Refills: 0 | Status: COMPLETED | OUTPATIENT
Start: 2018-09-05 | End: 2018-09-05

## 2018-09-05 RX ORDER — MUPIROCIN 20 MG/G
1 OINTMENT TOPICAL
Qty: 0 | Refills: 0 | Status: DISCONTINUED | OUTPATIENT
Start: 2018-09-05 | End: 2018-09-20

## 2018-09-05 RX ORDER — DIAZEPAM 5 MG
0 TABLET ORAL
Qty: 0 | Refills: 0 | COMMUNITY

## 2018-09-05 RX ORDER — TRAMADOL HYDROCHLORIDE 50 MG/1
1 TABLET ORAL
Qty: 0 | Refills: 0 | COMMUNITY

## 2018-09-05 RX ORDER — METHYLPHENIDATE HCL 5 MG
0 TABLET ORAL
Qty: 0 | Refills: 0 | COMMUNITY

## 2018-09-05 RX ORDER — OXYCODONE AND ACETAMINOPHEN 5; 325 MG/1; MG/1
1 TABLET ORAL EVERY 4 HOURS
Qty: 0 | Refills: 0 | Status: DISCONTINUED | OUTPATIENT
Start: 2018-09-05 | End: 2018-09-05

## 2018-09-05 RX ORDER — MUPIROCIN 20 MG/G
1 OINTMENT TOPICAL
Qty: 0 | Refills: 0 | COMMUNITY

## 2018-09-05 RX ORDER — DIAZEPAM 5 MG
5 TABLET ORAL
Qty: 0 | Refills: 0 | COMMUNITY

## 2018-09-05 RX ORDER — SODIUM CHLORIDE 9 MG/ML
1000 INJECTION, SOLUTION INTRAVENOUS
Qty: 0 | Refills: 0 | Status: DISCONTINUED | OUTPATIENT
Start: 2018-09-05 | End: 2018-09-05

## 2018-09-05 RX ORDER — OXYCODONE HYDROCHLORIDE 5 MG/1
10 TABLET ORAL ONCE
Qty: 0 | Refills: 0 | Status: DISCONTINUED | OUTPATIENT
Start: 2018-09-05 | End: 2018-09-05

## 2018-09-05 RX ADMIN — ONDANSETRON 4 MILLIGRAM(S): 8 TABLET, FILM COATED ORAL at 16:08

## 2018-09-05 RX ADMIN — APREPITANT 40 MILLIGRAM(S): 80 CAPSULE ORAL at 15:41

## 2018-09-05 RX ADMIN — FENTANYL CITRATE 50 MICROGRAM(S): 50 INJECTION INTRAVENOUS at 15:57

## 2018-09-05 RX ADMIN — FENTANYL CITRATE 50 MICROGRAM(S): 50 INJECTION INTRAVENOUS at 15:43

## 2018-09-05 RX ADMIN — FENTANYL CITRATE 50 MICROGRAM(S): 50 INJECTION INTRAVENOUS at 15:47

## 2018-09-05 RX ADMIN — OXYCODONE HYDROCHLORIDE 10 MILLIGRAM(S): 5 TABLET ORAL at 15:47

## 2018-09-05 RX ADMIN — FENTANYL CITRATE 50 MICROGRAM(S): 50 INJECTION INTRAVENOUS at 16:15

## 2018-09-05 RX ADMIN — OXYCODONE HYDROCHLORIDE 10 MILLIGRAM(S): 5 TABLET ORAL at 15:43

## 2018-09-05 NOTE — BRIEF OPERATIVE NOTE - PROCEDURE
<<-----Click on this checkbox to enter Procedure Inguinal hernia repair with mesh  09/05/2018    Active  [id number]

## 2018-09-05 NOTE — ASU PATIENT PROFILE, ADULT - AS SC BRADEN NUTRITION
Per J, she thinks she should see a primary care NP or PA to decide how to evaluate and who to refer to.  Advised patient of the above message.  Referral was placed.    (4) excellent

## 2018-09-05 NOTE — ASU PATIENT PROFILE, ADULT - PMH
ADD (attention deficit disorder)    Anxiety    H/O inguinal hernia  right  Injury of shoulder  left labrum tear  Pinched nerve  C5 and L5  Seizure  x2.5 years

## 2018-09-05 NOTE — ASU DISCHARGE PLAN (ADULT/PEDIATRIC). - MEDICATION SUMMARY - MEDICATIONS TO CHANGE
I will SWITCH the dose or number of times a day I take the medications listed below when I get home from the hospital:    traMADol 50 mg oral tablet  -- 1 tab(s) by mouth every 4 hours    Keppra  -- 1000 mg orally    Valium  -- 5 mg  by mouth , As Needed    Lyrica 75 mg oral capsule  -- 1  by mouth once a day    mupirocin 2% nasal ointment  -- 1 application into nose 2 times a day

## 2018-09-05 NOTE — ASU PATIENT PROFILE, ADULT - ANESTHESIA, PREVIOUS REACTION, PROFILE
Angie Perera   1/20/2017 8:40 AM   Office Visit    Dept Phone:  [phone]   Encounter #:  [id number]    Provider:  Shanda Dye MD   Department:  [facility]                Your Full Care Plan              Today's Medication Changes          These changes are accurate as of: 1/20/17  9:23 AM.  If you have any questions, ask your nurse or doctor.               Medication(s)that have changed:     * HYDROcodone-acetaminophen 5-325 MG per tablet   Commonly known as:  NORCO   Take 1 tablet by mouth 2 (Two) Times a Day As Needed (pain).   What changed:  Another medication with the same name was added. Make sure you understand how and when to take each.   Changed by:  Shanda Dye MD       * HYDROcodone-acetaminophen 7.5-325 MG per tablet   Commonly known as:  NORCO   Take 1 tablet by mouth 2 (Two) Times a Day As Needed (pain).   What changed:  You were already taking a medication with the same name, and this prescription was added. Make sure you understand how and when to take each.   Changed by:  Shanda Dye MD       * Notice:  This list has 2 medication(s) that are the same as other medications prescribed for you. Read the directions carefully, and ask your doctor or other care provider to review them with you.         Where to Get Your Medications      You can get these medications from any pharmacy     Bring a paper prescription for each of these medications     HYDROcodone-acetaminophen 7.5-325 MG per tablet                  Your Updated Medication List          This list is accurate as of: 1/20/17  9:23 AM.  Always use your most recent med list.                allopurinol 300 MG tablet   Commonly known as:  ZYLOPRIM       CALCIUM 1200 PO       cyclobenzaprine 10 MG tablet   Commonly known as:  FLEXERIL   Take 1 tablet by mouth 3 (Three) Times a Day As Needed for muscle spasms.       furosemide 40 MG tablet   Commonly  known as:  LASIX       gabapentin 800 MG tablet   Commonly known as:  NEURONTIN   Take 1 tablet by mouth 4 (Four) Times a Day.       * HYDROcodone-acetaminophen 5-325 MG per tablet   Commonly known as:  NORCO   Take 1 tablet by mouth 2 (Two) Times a Day As Needed (pain).       * HYDROcodone-acetaminophen 7.5-325 MG per tablet   Commonly known as:  NORCO   Take 1 tablet by mouth 2 (Two) Times a Day As Needed (pain).       ursodiol 300 MG capsule   Commonly known as:  ACTIGALL   Take 1 capsule by mouth 2 (two) times a day.       vitamin D3 5000 UNITS capsule capsule       * Notice:  This list has 2 medication(s) that are the same as other medications prescribed for you. Read the directions carefully, and ask your doctor or other care provider to review them with you.            You Were Diagnosed With        Codes Comments    Lumbar spinal stenosis    -  Primary ICD-10-CM: M48.06  ICD-9-CM: 724.02     Multiple joint pain     ICD-10-CM: M25.50  ICD-9-CM: 719.49       Instructions     None    Patient Instructions History      Upcoming Appointments     Visit Type Date Time Department    OFFICE VISIT 1/20/2017  8:40 AM [facility]    FOLLOW UP 2/15/2017  9:30 AM [facility]      MyChart Signup     Our records indicate that your [email or web address] account has been deactivated. If you would like to reactivate your account, please email [email or web address] or call [phone] to talk to our [employer] staff.             Other Info from Your Visit           Your Appointments     Feb 15, 2017  9:30 AM EST   Follow Up with DANIEL Quinones   [facility] (--)    [address]  [facility] [address] [phone]           Arrive 15 minutes prior to appointment.              Allergies     No Known Allergies      Reason for Visit     Back Pain     Extremity Pain     Pain right hip      Vital Signs     Blood Pressure Pulse Temperature Respirations Height  "Weight    138/83 90 98.3 °F (36.8 °C) 16 64\" (162.6 cm) 333 lb 12.8 oz (151 kg)    Oxygen Saturation Body Mass Index Smoking Status             95% 57.3 kg/m2 Never Smoker         Problems and Diagnoses Noted     Lumbar spinal stenosis    Multiple joint pain        " reflux/nausea/vomiting

## 2018-09-10 LAB — SURGICAL PATHOLOGY FINAL REPORT - CH: SIGNIFICANT CHANGE UP

## 2018-09-11 DIAGNOSIS — F17.210 NICOTINE DEPENDENCE, CIGARETTES, UNCOMPLICATED: ICD-10-CM

## 2018-09-11 DIAGNOSIS — K40.90 UNILATERAL INGUINAL HERNIA, WITHOUT OBSTRUCTION OR GANGRENE, NOT SPECIFIED AS RECURRENT: ICD-10-CM

## 2018-09-11 DIAGNOSIS — F32.9 MAJOR DEPRESSIVE DISORDER, SINGLE EPISODE, UNSPECIFIED: ICD-10-CM

## 2018-09-11 DIAGNOSIS — G40.909 EPILEPSY, UNSPECIFIED, NOT INTRACTABLE, WITHOUT STATUS EPILEPTICUS: ICD-10-CM

## 2018-09-11 DIAGNOSIS — F98.5 ADULT ONSET FLUENCY DISORDER: ICD-10-CM

## 2018-09-11 DIAGNOSIS — F41.9 ANXIETY DISORDER, UNSPECIFIED: ICD-10-CM

## 2018-09-11 DIAGNOSIS — D17.6 BENIGN LIPOMATOUS NEOPLASM OF SPERMATIC CORD: ICD-10-CM

## 2018-11-26 ENCOUNTER — TRANSCRIPTION ENCOUNTER (OUTPATIENT)
Age: 23
End: 2018-11-26

## 2019-02-15 ENCOUNTER — EMERGENCY (EMERGENCY)
Facility: HOSPITAL | Age: 24
LOS: 0 days | Discharge: HOME | End: 2019-02-16
Attending: EMERGENCY MEDICINE | Admitting: EMERGENCY MEDICINE
Payer: COMMERCIAL

## 2019-02-15 VITALS — WEIGHT: 134.92 LBS | HEIGHT: 73 IN

## 2019-02-15 DIAGNOSIS — G58.8 OTHER SPECIFIED MONONEUROPATHIES: ICD-10-CM

## 2019-02-15 DIAGNOSIS — G40.909 EPILEPSY, UNSPECIFIED, NOT INTRACTABLE, WITHOUT STATUS EPILEPTICUS: ICD-10-CM

## 2019-02-15 DIAGNOSIS — R11.2 NAUSEA WITH VOMITING, UNSPECIFIED: ICD-10-CM

## 2019-02-15 DIAGNOSIS — F41.9 ANXIETY DISORDER, UNSPECIFIED: ICD-10-CM

## 2019-02-15 DIAGNOSIS — Z98.890 OTHER SPECIFIED POSTPROCEDURAL STATES: Chronic | ICD-10-CM

## 2019-02-15 DIAGNOSIS — N50.811 RIGHT TESTICULAR PAIN: ICD-10-CM

## 2019-02-15 DIAGNOSIS — R11.10 VOMITING, UNSPECIFIED: ICD-10-CM

## 2019-02-15 DIAGNOSIS — F98.8 OTHER SPECIFIED BEHAVIORAL AND EMOTIONAL DISORDERS WITH ONSET USUALLY OCCURRING IN CHILDHOOD AND ADOLESCENCE: ICD-10-CM

## 2019-02-15 DIAGNOSIS — Z98.890 OTHER SPECIFIED POSTPROCEDURAL STATES: ICD-10-CM

## 2019-02-15 LAB
APPEARANCE UR: CLEAR — SIGNIFICANT CHANGE UP
BILIRUB UR-MCNC: NEGATIVE — SIGNIFICANT CHANGE UP
COLOR SPEC: YELLOW — SIGNIFICANT CHANGE UP
DIFF PNL FLD: NEGATIVE — SIGNIFICANT CHANGE UP
GLUCOSE UR QL: NEGATIVE MG/DL — SIGNIFICANT CHANGE UP
KETONES UR-MCNC: ABNORMAL
LEUKOCYTE ESTERASE UR-ACNC: NEGATIVE — SIGNIFICANT CHANGE UP
NITRITE UR-MCNC: NEGATIVE — SIGNIFICANT CHANGE UP
PH UR: 7 — SIGNIFICANT CHANGE UP (ref 5–8)
PROT UR-MCNC: NEGATIVE MG/DL — SIGNIFICANT CHANGE UP
SP GR SPEC: 1.01 — SIGNIFICANT CHANGE UP (ref 1.01–1.02)
UROBILINOGEN FLD QL: NEGATIVE MG/DL — SIGNIFICANT CHANGE UP

## 2019-02-15 PROCEDURE — 99284 EMERGENCY DEPT VISIT MOD MDM: CPT

## 2019-02-15 RX ORDER — SODIUM CHLORIDE 9 MG/ML
1000 INJECTION INTRAMUSCULAR; INTRAVENOUS; SUBCUTANEOUS ONCE
Qty: 0 | Refills: 0 | Status: DISCONTINUED | OUTPATIENT
Start: 2019-02-15 | End: 2019-02-15

## 2019-02-15 RX ORDER — ONDANSETRON 8 MG/1
4 TABLET, FILM COATED ORAL ONCE
Qty: 0 | Refills: 0 | Status: COMPLETED | OUTPATIENT
Start: 2019-02-15 | End: 2019-02-15

## 2019-02-15 RX ORDER — KETOROLAC TROMETHAMINE 30 MG/ML
30 SYRINGE (ML) INJECTION ONCE
Qty: 0 | Refills: 0 | Status: DISCONTINUED | OUTPATIENT
Start: 2019-02-15 | End: 2019-02-15

## 2019-02-15 RX ORDER — ACETAMINOPHEN 500 MG
1000 TABLET ORAL ONCE
Qty: 0 | Refills: 0 | Status: DISCONTINUED | OUTPATIENT
Start: 2019-02-15 | End: 2019-02-15

## 2019-02-15 RX ORDER — OXYCODONE AND ACETAMINOPHEN 5; 325 MG/1; MG/1
1 TABLET ORAL ONCE
Qty: 0 | Refills: 0 | Status: DISCONTINUED | OUTPATIENT
Start: 2019-02-15 | End: 2019-02-15

## 2019-02-15 RX ORDER — SODIUM CHLORIDE 9 MG/ML
1000 INJECTION INTRAMUSCULAR; INTRAVENOUS; SUBCUTANEOUS ONCE
Qty: 0 | Refills: 0 | Status: COMPLETED | OUTPATIENT
Start: 2019-02-15 | End: 2019-02-15

## 2019-02-15 RX ORDER — ONDANSETRON 8 MG/1
4 TABLET, FILM COATED ORAL ONCE
Qty: 0 | Refills: 0 | Status: DISCONTINUED | OUTPATIENT
Start: 2019-02-15 | End: 2019-02-15

## 2019-02-15 RX ADMIN — SODIUM CHLORIDE 2000 MILLILITER(S): 9 INJECTION INTRAMUSCULAR; INTRAVENOUS; SUBCUTANEOUS at 23:42

## 2019-02-15 RX ADMIN — ONDANSETRON 4 MILLIGRAM(S): 8 TABLET, FILM COATED ORAL at 23:40

## 2019-02-15 RX ADMIN — ONDANSETRON 4 MILLIGRAM(S): 8 TABLET, FILM COATED ORAL at 23:42

## 2019-02-15 RX ADMIN — Medication 30 MILLIGRAM(S): at 23:41

## 2019-02-15 NOTE — ED ADULT TRIAGE NOTE - CHIEF COMPLAINT QUOTE
Pt presents c/o right sided testicle pain. Pt flew in from [facility] yesterday. @ days ago while in [facility], pt went to the hospital for same issue, but testicle "untwisted." Pt c/o increased pain, vomiting. pt seen by Dr. Aviles today and ultrasound was done and showed good blood flow.

## 2019-02-15 NOTE — ED STATDOCS - PROGRESS NOTE DETAILS
Pt with testicular for days which started while on vacation with family in [facility]. R/o torsion, r/o stone, t/o std, r/o torsion of epidydimal appendage. Will send to cassie Becerril MD

## 2019-02-15 NOTE — ED STATDOCS - OBJECTIVE STATEMENT
Pt is a 25 yo wm with hx of idiopathic sz and anxiety, who was visiting [facility] with family. Ptates was in shower and right testicle twisted and became painful "720  degrees" Pt untwisted it himself and  went to hospital where he was told that he had normal circulation and was advised to fly home and followu p with his own doc. pt saw Dr. Aviles today who did u/s in office and said circulation was fine. Pt states pain has persisted and he has nausea and vomiting. No fever. Has dysuria and frequency.  Also c/o pain to right back and abdomen.  In Sept pt had hernia surgery by Dr. Aviles which had no complication. No recent heavy lifting. No penile discharge. pt here with parents. rare smoker. occ takes cnnibis for muscle pain.  Allergic cyclobenaprine.  pt states testicle was moving on own and was high rising when symptoms started. hard to sit still.

## 2019-02-15 NOTE — ED ADULT NURSE NOTE - CHIEF COMPLAINT QUOTE
Pt presents c/o right sided testicle pain. Pt flew in from [facility] yesterday. @ days ago while in [facility], pt went to the hospital for same issue, but testicle "untwisted." Pt c/o increased pain, vomiting. pt seen by Dr. Aviles today and ultrasound was done and showed good blood flow.

## 2019-02-15 NOTE — ED ADULT NURSE NOTE - OBJECTIVE STATEMENT
Pt reports to ED complaining of testicular pain. Pt states that he was in [facility] and woke up in extreme pain, went into the shower and found his testicle to be twisted. Pt states that he untwisted his testicle and went to the ED. At the ED in [facility], they did an ultrasound and found that there was normal blood flow and discharged him. Pt returned home yesterday

## 2019-02-15 NOTE — ED STATDOCS - CLINICAL SUMMARY MEDICAL DECISION MAKING FREE TEXT BOX
Pt with severe testicular pain for two days with neg us. R/o torsion, epididymitis, kidney stone, uti.

## 2019-02-16 VITALS
TEMPERATURE: 97 F | RESPIRATION RATE: 20 BRPM | OXYGEN SATURATION: 98 % | SYSTOLIC BLOOD PRESSURE: 137 MMHG | DIASTOLIC BLOOD PRESSURE: 95 MMHG | HEART RATE: 68 BPM

## 2019-02-16 PROBLEM — S49.90XA UNSPECIFIED INJURY OF SHOULDER AND UPPER ARM, UNSPECIFIED ARM, INITIAL ENCOUNTER: Chronic | Status: ACTIVE | Noted: 2018-09-05

## 2019-02-16 PROBLEM — Z87.19 PERSONAL HISTORY OF OTHER DISEASES OF THE DIGESTIVE SYSTEM: Chronic | Status: ACTIVE | Noted: 2018-09-05

## 2019-02-16 PROBLEM — G58.9 MONONEUROPATHY, UNSPECIFIED: Chronic | Status: ACTIVE | Noted: 2018-09-05

## 2019-02-16 PROBLEM — F41.9 ANXIETY DISORDER, UNSPECIFIED: Chronic | Status: ACTIVE | Noted: 2018-09-05

## 2019-02-16 PROCEDURE — 76870 US EXAM SCROTUM: CPT | Mod: 26

## 2019-02-16 RX ORDER — ONDANSETRON 8 MG/1
1 TABLET, FILM COATED ORAL
Qty: 10 | Refills: 0 | OUTPATIENT
Start: 2019-02-16 | End: 2019-02-20

## 2019-02-16 NOTE — ED PROVIDER NOTE - OBJECTIVE STATEMENT
pt presents with non bloody non bilious vomiting and right testicle pain x 1 days seen by dr hunt earlier in day [facility] u[facility] but here for persitent pain in right testicle no penile dishcarge. no testicle swelling. no rectal bleeding. denies fever. denies HA or neck pain. no chest pain or sob. no abd pain. . no urinary f/u/d. no back pain at my evaluation . no motor or sensory deficits. denies illicit drug use. no recent travel. no rash. no other acute issues symptoms or concerns

## 2019-02-16 NOTE — ED PROVIDER NOTE - PHYSICAL EXAMINATION
gu : cremasterics reflexes intact b/l + ttp right testicle without swelling no penile shaft ttp no ttp left testicle no hernia. no inguinal lad

## 2019-02-17 LAB
CULTURE RESULTS: NO GROWTH — SIGNIFICANT CHANGE UP
SPECIMEN SOURCE: SIGNIFICANT CHANGE UP

## 2019-04-19 ENCOUNTER — EMERGENCY (EMERGENCY)
Facility: HOSPITAL | Age: 24
LOS: 0 days | Discharge: HOME | End: 2019-04-19
Attending: EMERGENCY MEDICINE | Admitting: EMERGENCY MEDICINE
Payer: COMMERCIAL

## 2019-04-19 VITALS
OXYGEN SATURATION: 97 % | DIASTOLIC BLOOD PRESSURE: 82 MMHG | SYSTOLIC BLOOD PRESSURE: 122 MMHG | RESPIRATION RATE: 16 BRPM | HEART RATE: 68 BPM | TEMPERATURE: 98 F

## 2019-04-19 VITALS — HEIGHT: 72 IN | WEIGHT: 130.07 LBS

## 2019-04-19 DIAGNOSIS — R11.11 VOMITING WITHOUT NAUSEA: ICD-10-CM

## 2019-04-19 DIAGNOSIS — Z98.890 OTHER SPECIFIED POSTPROCEDURAL STATES: Chronic | ICD-10-CM

## 2019-04-19 DIAGNOSIS — F41.9 ANXIETY DISORDER, UNSPECIFIED: ICD-10-CM

## 2019-04-19 DIAGNOSIS — N50.811 RIGHT TESTICULAR PAIN: ICD-10-CM

## 2019-04-19 DIAGNOSIS — I86.1 SCROTAL VARICES: ICD-10-CM

## 2019-04-19 LAB
ALBUMIN SERPL ELPH-MCNC: 4.4 G/DL — SIGNIFICANT CHANGE UP (ref 3.3–5)
ALP SERPL-CCNC: 50 U/L — SIGNIFICANT CHANGE UP (ref 40–120)
ALT FLD-CCNC: 17 U/L — SIGNIFICANT CHANGE UP (ref 12–78)
ANION GAP SERPL CALC-SCNC: 5 MMOL/L — SIGNIFICANT CHANGE UP (ref 5–17)
APPEARANCE UR: CLEAR — SIGNIFICANT CHANGE UP
APTT BLD: 30.3 SEC — SIGNIFICANT CHANGE UP (ref 27.5–36.3)
AST SERPL-CCNC: 15 U/L — SIGNIFICANT CHANGE UP (ref 15–37)
BASOPHILS # BLD AUTO: 0.06 K/UL — SIGNIFICANT CHANGE UP (ref 0–0.2)
BASOPHILS NFR BLD AUTO: 1.1 % — SIGNIFICANT CHANGE UP (ref 0–2)
BILIRUB SERPL-MCNC: 0.5 MG/DL — SIGNIFICANT CHANGE UP (ref 0.2–1.2)
BILIRUB UR-MCNC: NEGATIVE — SIGNIFICANT CHANGE UP
BUN SERPL-MCNC: 10 MG/DL — SIGNIFICANT CHANGE UP (ref 7–23)
CALCIUM SERPL-MCNC: 8.8 MG/DL — SIGNIFICANT CHANGE UP (ref 8.5–10.1)
CHLORIDE SERPL-SCNC: 104 MMOL/L — SIGNIFICANT CHANGE UP (ref 96–108)
CO2 SERPL-SCNC: 30 MMOL/L — SIGNIFICANT CHANGE UP (ref 22–31)
COLOR SPEC: YELLOW — SIGNIFICANT CHANGE UP
CREAT SERPL-MCNC: 0.88 MG/DL — SIGNIFICANT CHANGE UP (ref 0.5–1.3)
DIFF PNL FLD: NEGATIVE — SIGNIFICANT CHANGE UP
EOSINOPHIL # BLD AUTO: 0.05 K/UL — SIGNIFICANT CHANGE UP (ref 0–0.5)
EOSINOPHIL NFR BLD AUTO: 0.9 % — SIGNIFICANT CHANGE UP (ref 0–6)
GLUCOSE SERPL-MCNC: 83 MG/DL — SIGNIFICANT CHANGE UP (ref 70–99)
GLUCOSE UR QL: NEGATIVE MG/DL — SIGNIFICANT CHANGE UP
HCT VFR BLD CALC: 41.7 % — SIGNIFICANT CHANGE UP (ref 39–50)
HGB BLD-MCNC: 14.3 G/DL — SIGNIFICANT CHANGE UP (ref 13–17)
IMM GRANULOCYTES NFR BLD AUTO: 0.4 % — SIGNIFICANT CHANGE UP (ref 0–1.5)
INR BLD: 1.03 RATIO — SIGNIFICANT CHANGE UP (ref 0.88–1.16)
KETONES UR-MCNC: NEGATIVE — SIGNIFICANT CHANGE UP
LEUKOCYTE ESTERASE UR-ACNC: NEGATIVE — SIGNIFICANT CHANGE UP
LYMPHOCYTES # BLD AUTO: 2.84 K/UL — SIGNIFICANT CHANGE UP (ref 1–3.3)
LYMPHOCYTES # BLD AUTO: 50.6 % — HIGH (ref 13–44)
MCHC RBC-ENTMCNC: 29.1 PG — SIGNIFICANT CHANGE UP (ref 27–34)
MCHC RBC-ENTMCNC: 34.3 GM/DL — SIGNIFICANT CHANGE UP (ref 32–36)
MCV RBC AUTO: 84.8 FL — SIGNIFICANT CHANGE UP (ref 80–100)
MONOCYTES # BLD AUTO: 0.49 K/UL — SIGNIFICANT CHANGE UP (ref 0–0.9)
MONOCYTES NFR BLD AUTO: 8.7 % — SIGNIFICANT CHANGE UP (ref 2–14)
NEUTROPHILS # BLD AUTO: 2.15 K/UL — SIGNIFICANT CHANGE UP (ref 1.8–7.4)
NEUTROPHILS NFR BLD AUTO: 38.3 % — LOW (ref 43–77)
NITRITE UR-MCNC: NEGATIVE — SIGNIFICANT CHANGE UP
NRBC # BLD: 0 /100 WBCS — SIGNIFICANT CHANGE UP (ref 0–0)
PH UR: 6 — SIGNIFICANT CHANGE UP (ref 5–8)
PLATELET # BLD AUTO: 218 K/UL — SIGNIFICANT CHANGE UP (ref 150–400)
POTASSIUM SERPL-MCNC: 3.7 MMOL/L — SIGNIFICANT CHANGE UP (ref 3.5–5.3)
POTASSIUM SERPL-SCNC: 3.7 MMOL/L — SIGNIFICANT CHANGE UP (ref 3.5–5.3)
PROT SERPL-MCNC: 7.2 GM/DL — SIGNIFICANT CHANGE UP (ref 6–8.3)
PROT UR-MCNC: NEGATIVE MG/DL — SIGNIFICANT CHANGE UP
PROTHROM AB SERPL-ACNC: 11.5 SEC — SIGNIFICANT CHANGE UP (ref 10–12.9)
RBC # BLD: 4.92 M/UL — SIGNIFICANT CHANGE UP (ref 4.2–5.8)
RBC # FLD: 11.8 % — SIGNIFICANT CHANGE UP (ref 10.3–14.5)
SODIUM SERPL-SCNC: 139 MMOL/L — SIGNIFICANT CHANGE UP (ref 135–145)
SP GR SPEC: 1.01 — SIGNIFICANT CHANGE UP (ref 1.01–1.02)
UROBILINOGEN FLD QL: NEGATIVE MG/DL — SIGNIFICANT CHANGE UP
WBC # BLD: 5.61 K/UL — SIGNIFICANT CHANGE UP (ref 3.8–10.5)
WBC # FLD AUTO: 5.61 K/UL — SIGNIFICANT CHANGE UP (ref 3.8–10.5)

## 2019-04-19 PROCEDURE — 99284 EMERGENCY DEPT VISIT MOD MDM: CPT | Mod: 25

## 2019-04-19 PROCEDURE — 76870 US EXAM SCROTUM: CPT | Mod: 26

## 2019-04-19 RX ORDER — MORPHINE SULFATE 50 MG/1
4 CAPSULE, EXTENDED RELEASE ORAL ONCE
Qty: 0 | Refills: 0 | Status: DISCONTINUED | OUTPATIENT
Start: 2019-04-19 | End: 2019-04-19

## 2019-04-19 RX ORDER — ONDANSETRON 8 MG/1
4 TABLET, FILM COATED ORAL ONCE
Qty: 0 | Refills: 0 | Status: COMPLETED | OUTPATIENT
Start: 2019-04-19 | End: 2019-04-19

## 2019-04-19 RX ADMIN — MORPHINE SULFATE 4 MILLIGRAM(S): 50 CAPSULE, EXTENDED RELEASE ORAL at 18:19

## 2019-04-19 RX ADMIN — MORPHINE SULFATE 4 MILLIGRAM(S): 50 CAPSULE, EXTENDED RELEASE ORAL at 17:49

## 2019-04-19 RX ADMIN — ONDANSETRON 4 MILLIGRAM(S): 8 TABLET, FILM COATED ORAL at 17:49

## 2019-04-19 NOTE — ED STATDOCS - CLINICAL SUMMARY MEDICAL DECISION MAKING FREE TEXT BOX
Pt presented with acute onset of testicular pain, consistent with previous reported episode of torsion. Labs unremarkable. US shows no evidence of torsion, epididymitis, orchitis, mass. Advised motrin, use of briefs. Return to ED in event of significantly worsening pain. Follow up with urology at [facility] as scheduled.

## 2019-04-19 NOTE — ED STATDOCS - GENITOURINARY, MLM
normal... +mild right testicular pain, normal lye, cremasteric reflexes intact. no overlying erythema, edema,

## 2019-04-19 NOTE — ED ADULT TRIAGE NOTE - CHIEF COMPLAINT QUOTE
severe right testicle pain, pain has been there on and off for months, constant over past few days and now severe today. hx of torsion in past.

## 2019-04-19 NOTE — ED STATDOCS - OBJECTIVE STATEMENT
23 y/o male with a PMHx of ADD, Anxiety, Seizure, pinched nerve C5L5, left shoulder labrum tear, h/o inguinal hernia right repair, endoscopy, left labral shoulder repair presents to the ED c/o right testicular pain x3 hours. States 2 months ago he twisted his right testicle and untwisted it himself. States he has since been following up with a urologist. Since has had some testicular twisting that he untwists on his own. Today while turning in bed twisted his right testicle. States he has been unable to untwist it. Reports he has some dysuria and urine frequency. Earlier today had an episode of emesis. Scheduled to see Dr. Gunn in [facility] soon.

## 2019-04-19 NOTE — ED ADULT NURSE NOTE - NSIMPLEMENTINTERV_GEN_ALL_ED
Implemented All Universal Safety Interventions:  [place] to call system. Call bell, personal items and telephone within reach. Instruct patient to call for assistance. Room bathroom lighting operational. Non-slip footwear when patient is off stretcher. Physically safe environment: no spills, clutter or unnecessary equipment. Stretcher in lowest position, wheels locked, appropriate side rails in place.

## 2019-04-19 NOTE — ED ADULT NURSE NOTE - OBJECTIVE STATEMENT
pt reports severe testicular pain on and off for months, today pt reports pain being 10/10 prompting visit to ED

## 2019-04-19 NOTE — ED STATDOCS - PROGRESS NOTE DETAILS
23 y/o M 23 y/o M with PMH of ADD, anxiety, seizures presents with testicular pain x 3 hours. States he has history of testicular torsion, which he has "untwisted" by himself. Currently followed by 25 y/o M with PMH of ADD, anxiety, seizures presents with testicular pain x 3 hours. States he has history of testicular torsion, which he has "untwisted" by himself. Currently followed by private urologist. States he was in bed today, changing position when pain started. Unable to untwist his testicle today. +Dysuria, and frequency. Denies fever, chills. +vomiting x1. PE: uncomfortable in appearance. Cardiac: s1s2, RRR. Lungs: CTAB. Abdomen: NBS x4, soft, nontender. Testicular exam: normal lie, cremaster reflex present. No significant erythema, edema surrounding testicles. +TTP right testicle. A/P: r/o torsion. Plan for US, labs, UA, reassess.  - Erik Mabry PA-C Labs and imaging reviewed with patient. No evidence of torsion on US. +Varicoceles on US. Advised patient motrin, use of briefs. Advised to return at any time in event of significantly worsening pain for repeat US. Follow up with Dr. Gunn at [facility] as scheduled. Plan for d.c at this time. - Erik Mabry PA-C

## 2019-04-28 ENCOUNTER — EMERGENCY (EMERGENCY)
Facility: HOSPITAL | Age: 24
LOS: 0 days | Discharge: HOME | End: 2019-04-28
Attending: EMERGENCY MEDICINE | Admitting: EMERGENCY MEDICINE
Payer: COMMERCIAL

## 2019-04-28 VITALS
OXYGEN SATURATION: 99 % | TEMPERATURE: 98 F | RESPIRATION RATE: 18 BRPM | DIASTOLIC BLOOD PRESSURE: 84 MMHG | SYSTOLIC BLOOD PRESSURE: 144 MMHG | HEART RATE: 72 BPM

## 2019-04-28 VITALS — WEIGHT: 130.07 LBS | HEIGHT: 72 IN

## 2019-04-28 DIAGNOSIS — Z98.890 OTHER SPECIFIED POSTPROCEDURAL STATES: Chronic | ICD-10-CM

## 2019-04-28 DIAGNOSIS — N50.812 LEFT TESTICULAR PAIN: ICD-10-CM

## 2019-04-28 DIAGNOSIS — F41.9 ANXIETY DISORDER, UNSPECIFIED: ICD-10-CM

## 2019-04-28 DIAGNOSIS — R56.9 UNSPECIFIED CONVULSIONS: ICD-10-CM

## 2019-04-28 DIAGNOSIS — Z87.19 PERSONAL HISTORY OF OTHER DISEASES OF THE DIGESTIVE SYSTEM: ICD-10-CM

## 2019-04-28 DIAGNOSIS — N50.811 RIGHT TESTICULAR PAIN: ICD-10-CM

## 2019-04-28 DIAGNOSIS — N50.819 TESTICULAR PAIN, UNSPECIFIED: ICD-10-CM

## 2019-04-28 DIAGNOSIS — F98.8 OTHER SPECIFIED BEHAVIORAL AND EMOTIONAL DISORDERS WITH ONSET USUALLY OCCURRING IN CHILDHOOD AND ADOLESCENCE: ICD-10-CM

## 2019-04-28 DIAGNOSIS — Z91.030 BEE ALLERGY STATUS: ICD-10-CM

## 2019-04-28 LAB
APPEARANCE UR: CLEAR — SIGNIFICANT CHANGE UP
BILIRUB UR-MCNC: NEGATIVE — SIGNIFICANT CHANGE UP
COLOR SPEC: YELLOW — SIGNIFICANT CHANGE UP
DIFF PNL FLD: NEGATIVE — SIGNIFICANT CHANGE UP
GLUCOSE UR QL: NEGATIVE MG/DL — SIGNIFICANT CHANGE UP
KETONES UR-MCNC: NEGATIVE — SIGNIFICANT CHANGE UP
LEUKOCYTE ESTERASE UR-ACNC: NEGATIVE — SIGNIFICANT CHANGE UP
NITRITE UR-MCNC: NEGATIVE — SIGNIFICANT CHANGE UP
PH UR: 7 — SIGNIFICANT CHANGE UP (ref 5–8)
PROT UR-MCNC: NEGATIVE MG/DL — SIGNIFICANT CHANGE UP
SP GR SPEC: 1.01 — SIGNIFICANT CHANGE UP (ref 1.01–1.02)
UROBILINOGEN FLD QL: NEGATIVE MG/DL — SIGNIFICANT CHANGE UP

## 2019-04-28 PROCEDURE — 99284 EMERGENCY DEPT VISIT MOD MDM: CPT | Mod: 25

## 2019-04-28 PROCEDURE — 76870 US EXAM SCROTUM: CPT | Mod: 26

## 2019-04-28 RX ORDER — KETOROLAC TROMETHAMINE 30 MG/ML
30 SYRINGE (ML) INJECTION ONCE
Qty: 0 | Refills: 0 | Status: DISCONTINUED | OUTPATIENT
Start: 2019-04-28 | End: 2019-04-28

## 2019-04-28 RX ADMIN — Medication 30 MILLIGRAM(S): at 06:05

## 2019-04-28 RX ADMIN — Medication 30 MILLIGRAM(S): at 04:56

## 2019-04-28 NOTE — ED PROVIDER NOTE - NSFOLLOWUPINSTRUCTIONS_ED_ALL_ED_FT
Follow up with your doctor and the urologist Monday  Return to the ER for any worsening symptoms, or any other concerns  Ibuprofen 600mg every 6 hours as needed for pain

## 2019-04-28 NOTE — ED ADULT NURSE NOTE - NSIMPLEMENTINTERV_GEN_ALL_ED
Implemented All Universal Safety Interventions:  [place] to call system. Call bell, personal items and telephone within reach. Instruct patient to call for assistance. Room bathroom lighting operational. Non-slip footwear when patient is off stretcher. Physically safe environment: no spills, clutter or unnecessary equipment. Stretcher in lowest position, wheels locked, appropriate side rails in place.

## 2019-04-28 NOTE — ED PROVIDER NOTE - GENITOURINARY, MLM
uncircumcised penis.  no discharge.  scrotal sac appears normal with normal lie of testicles.  No palpable masses.  Diffusely tender without specific point tenderness.  +cremasteric reflex b/l  No discoloration.

## 2019-04-28 NOTE — ED PROVIDER NOTE - OBJECTIVE STATEMENT
25 yo male c/o testicular pain. Patient reports that he has had testicular pain since February 2019.  First occurred while sleeping while on vacation in [facility]. He felt like the testicle twisted on itself and so he untwisted it on his own.  He then had recurrent pain.  Seen in ED and then seen by urology Dr Aviles and then seen again in ED last week for same symptoms.  His workups have been negative so far with ultrasounds showing no pathology.  He was advised by Dr Aviles to have a second opinion by a specialist at [facility] and has an appointment scheduled for the beginning of May.  patient c/o pain in the right testes since Saturday morning, felt like it was twisted and then untwisted and then pain again in the left testes, didn't think it twisted but thought it looked different.  He takes motrin and tylenol without complete relief of symptoms. No fever.

## 2019-04-28 NOTE — ED ADULT NURSE REASSESSMENT NOTE - NS ED NURSE REASSESS COMMENT FT1
Discharged to home. VSS. Discharge instructions & paperwork discussed & given to pt. Pt verbalized understanding. Pt going with family. Stable to discharge. Left ER ambulatory, steady gait noted

## 2019-04-28 NOTE — ED ADULT TRIAGE NOTE - CHIEF COMPLAINT QUOTE
Pt presents to ER c/o testicular pain. Onset of symptoms began yesterday morning on the right side and was able to un twist testicle and relieve symptoms; later in the day left testicle became twisted and pain has been consistent. Pt reports being seen in  ER one week ago for similar complaint. Pt reports being seen by a specialist r/t recurrent symptoms

## 2019-04-28 NOTE — ED ADULT NURSE NOTE - OBJECTIVE STATEMENT
hx testicular twisting since February 2019. Was seen by a urologist. Pt is due to follow up with a specialist in [facility] in 2 weeks. C/o testicular pain & twisting & pain on urination. Pt was able to void in urinal. hx testicular twisting since February 2019. Was seen by a urologist. Pt is due to follow up with a specialist in [facility] in 2 weeks. C/o testicular pain & twisting & pain on urination. Pt was able to void in urinal. Pt reports he usually has twisting with the right testicle, but today the left testicle twisted around and he attempted to turn it back around.

## 2019-04-29 LAB
N GONORRHOEA RRNA SPEC QL NAA+PROBE: SIGNIFICANT CHANGE UP
SPECIMEN SOURCE: SIGNIFICANT CHANGE UP

## 2019-06-25 ENCOUNTER — EMERGENCY (EMERGENCY)
Facility: HOSPITAL | Age: 24
LOS: 0 days | Discharge: HOME | End: 2019-06-25
Attending: EMERGENCY MEDICINE | Admitting: EMERGENCY MEDICINE
Payer: COMMERCIAL

## 2019-06-25 VITALS
DIASTOLIC BLOOD PRESSURE: 82 MMHG | SYSTOLIC BLOOD PRESSURE: 135 MMHG | HEART RATE: 61 BPM | TEMPERATURE: 98 F | RESPIRATION RATE: 19 BRPM | OXYGEN SATURATION: 100 %

## 2019-06-25 VITALS — WEIGHT: 130.07 LBS | HEIGHT: 72 IN
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
## 2019-06-25 DIAGNOSIS — Z88.8 ALLERGY STATUS TO OTHER DRUGS, MEDICAMENTS AND BIOLOGICAL SUBSTANCES: ICD-10-CM

## 2019-06-25 DIAGNOSIS — Z91.030 BEE ALLERGY STATUS: ICD-10-CM

## 2019-06-25 DIAGNOSIS — Z98.890 OTHER SPECIFIED POSTPROCEDURAL STATES: Chronic | ICD-10-CM

## 2019-06-25 DIAGNOSIS — T63.891A: ICD-10-CM
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
## 2019-06-25 DIAGNOSIS — T78.40XA ALLERGY, UNSPECIFIED, INITIAL ENCOUNTER: ICD-10-CM

## 2019-06-25 PROCEDURE — 99285 EMERGENCY DEPT VISIT HI MDM: CPT | Mod: 25

## 2019-06-25 RX ORDER — SODIUM CHLORIDE 9 MG/ML
1000 INJECTION INTRAMUSCULAR; INTRAVENOUS; SUBCUTANEOUS ONCE
Refills: 0 | Status: COMPLETED | OUTPATIENT
Start: 2019-06-25 | End: 2019-06-25

## 2019-06-25 RX ORDER — FAMOTIDINE 10 MG/ML
20 INJECTION INTRAVENOUS ONCE
Refills: 0 | Status: COMPLETED | OUTPATIENT
Start: 2019-06-25 | End: 2019-06-25

## 2019-06-25 RX ORDER — ONDANSETRON 8 MG/1
4 TABLET, FILM COATED ORAL ONCE
Refills: 0 | Status: COMPLETED | OUTPATIENT
Start: 2019-06-25 | End: 2019-06-25

## 2019-06-25 RX ORDER — DIPHENHYDRAMINE HCL 50 MG
1 CAPSULE ORAL
Qty: 12 | Refills: 0
Start: 2019-06-25 | End: 2019-06-28

## 2019-06-25 RX ORDER — IBUPROFEN 200 MG
600 TABLET ORAL ONCE
Refills: 0 | Status: COMPLETED | OUTPATIENT
Start: 2019-06-25 | End: 2019-06-25

## 2019-06-25 RX ORDER — FAMOTIDINE 10 MG/ML
1 INJECTION INTRAVENOUS
Qty: 8 | Refills: 0
Start: 2019-06-25 | End: 2019-06-28

## 2019-06-25 RX ADMIN — Medication 125 MILLIGRAM(S): at 21:27

## 2019-06-25 RX ADMIN — SODIUM CHLORIDE 1000 MILLILITER(S): 9 INJECTION INTRAMUSCULAR; INTRAVENOUS; SUBCUTANEOUS at 21:17

## 2019-06-25 RX ADMIN — Medication 600 MILLIGRAM(S): at 23:27

## 2019-06-25 RX ADMIN — ONDANSETRON 4 MILLIGRAM(S): 8 TABLET, FILM COATED ORAL at 21:28

## 2019-06-25 RX ADMIN — FAMOTIDINE 20 MILLIGRAM(S): 10 INJECTION INTRAVENOUS at 21:29

## 2019-06-25 RX ADMIN — SODIUM CHLORIDE 1000 MILLILITER(S): 9 INJECTION INTRAMUSCULAR; INTRAVENOUS; SUBCUTANEOUS at 22:18

## 2019-06-25 NOTE — ED STATDOCS - OBJECTIVE STATEMENT
25 y/o M with hx of anxiety presents to the ED with a  female family member after a suspected insect bite/bee sting to his right flank sustained 25 minutes PTA. PTA in the ED he took 25mg Benadryl. The bite occurred while he was outdoors in his yard, he did not see any insects nearby. Pt is not experiencing any difficulty breathing, SOB, wheezing, or dysphagia. Pt denies any fevers, chills, recent illness, CP, SOB, cough, congestion, abdominal pain, n/v/d, urinary sx's, back pain, neck pain, or other musculoskeletal pain. No further complaints at this time.

## 2019-06-25 NOTE — ED STATDOCS - PROGRESS NOTE DETAILS
25 y/o M with hx of anxiety presents to the ED with a  female family member after a suspected insect bite/bee sting to his right flank sustained 25 minutes PTA. PTA in the ED he took 25mg Benadryl. The bite occurred while he was outdoors in his yard, he did not see any insects nearby. Pt is not experiencing any difficulty breathing, SOB, wheezing, or dysphagia. Pt denies any fevers, chills, recent illness, CP, SOB, cough, congestion, abdominal pain, n/v/d, urinary sx's, back pain, neck pain, or other musculoskeletal pain. No further complaints at this time. Pt. states sting to right flank 30 minutes PTA.  Pt. feeling anxious, numbness to face, vomited x 1.  Will provide steroids, Pepcid, Benadryl.  Ruthy Aldridge PA-C Pt. sitting comfortably.  Neg. SOB.  Neg. distress.  Site clean.  Neg. hives.  Will DC with prednisone, pepcid and Benadryl.  Ruthy Aldridge PA-C

## 2019-06-25 NOTE — ED STATDOCS - ENMT, MLM
Patent airway. No Intraoral involvement. Nasal mucosa clear.  Mouth with normal mucosa  Throat has no vesicles, no oropharyngeal exudates and uvula is midline.

## 2019-06-25 NOTE — ED STATDOCS - CLINICAL SUMMARY MEDICAL DECISION MAKING FREE TEXT BOX
Pt. sitting comfortably.  Neg. SOB.  Neg. distress.  Site clean.  Neg. hives.  Will DC with prednisone, pepcid and Benadryl.  Ruthy Aldridge PA-C

## 2019-06-25 NOTE — ED ADULT NURSE NOTE - NSIMPLEMENTINTERV_GEN_ALL_ED
Implemented All Universal Safety Interventions:  [place] to call system. Call bell, personal items and telephone within reach. Instruct patient to call for assistance. Room bathroom lighting operational. Non-slip footwear when patient is off stretcher. Physically safe environment: no spills, clutter or unnecessary equipment. Stretcher in lowest position, wheels locked, appropriate side rails in place.

## 2019-06-25 NOTE — ED STATDOCS - NSFOLLOWUPINSTRUCTIONS_ED_ALL_ED_FT
General Allergic Reaction    WHAT YOU NEED TO KNOW:    An allergic reaction is your body's response to an allergen. Allergens include medicines, food, insect stings, animal dander, mold, latex, chemicals, and dust mites. Pollen from trees, grass, and weeds can also cause an allergic reaction. An allergic reaction can range from mild to severe.    DISCHARGE INSTRUCTIONS:    Call 911 for signs or symptoms of anaphylaxis, such as trouble breathing, swelling in your mouth or throat, or wheezing. You may also have itching, a rash, hives, or feel like you are going to faint.    Return to the emergency department if:     You have a skin rash, hives, swelling, or itching that is starting to get worse.      Your throat tightens, or your lips or tongue swell.      You have trouble swallowing or speaking.      You have worsening nausea, diarrhea, or abdominal cramps, or you are vomiting.      You have chest pain or tightness.    Contact your healthcare provider if:     You have questions or concerns about your condition or care.        Medicines: You may need any of the following:     Medicines may be given to relieve certain allergy symptoms such as itching, sneezing, and swelling. You may take them as a pill or use drops in your nose or eyes. Topical treatments may be given to put directly on your skin to help decrease itching or swelling.      Epinephrine may be prescribed if you are at risk for anaphylaxis. This is a severe allergic reaction that can be life-threatening. Your healthcare provider will tell you if you need to keep epinephrine with you. You will be taught when and how to use it.      Take your medicine as directed. Contact your healthcare provider if you think your medicine is not helping or if you have side effects. Tell him of her if you are allergic to any medicine. Keep a list of the medicines, vitamins, and herbs you take. Include the amounts, and when and why you take them. Bring the list or the pill bottles to follow-up visits. Carry your medicine list with you in case of an emergency.    Follow up with your healthcare provider as directed: Write down your questions so you remember to ask them during your visits.     Manage your symptoms:     Avoid allergens. You may need to have allergy testing with your healthcare provider or a specialist to find your allergens.      Use cold compresses on your skin or eyes. This will help soothe skin or eyes affected by the allergic reaction. You can make a cold compress by soaking a washcloth in cool water. Wring out the extra water before you apply the washcloth.      Rinse your nasal passages with a saline solution. Daily rinsing may help clear allergens out of your nose. Use distilled water if possible. You can also boil tap water and then let it cool before you use it. Do not use tap water without boiling it first.      Do not smoke. Nicotine and other chemicals in cigarettes and cigars can make an allergic reaction worse, and can also cause lung damage. Ask your healthcare provider for information if you currently smoke and need help to quit. E-cigarettes or smokeless tobacco still contain nicotine. Talk to your healthcare provider before you use these products.

## 2019-07-05 ENCOUNTER — EMERGENCY (EMERGENCY)
Facility: HOSPITAL | Age: 24
LOS: 0 days | Discharge: HOME | End: 2019-07-05
Attending: EMERGENCY MEDICINE | Admitting: EMERGENCY MEDICINE
Payer: COMMERCIAL

## 2019-07-05 VITALS — WEIGHT: 130.07 LBS | HEIGHT: 72 IN

## 2019-07-05 VITALS
TEMPERATURE: 97 F | RESPIRATION RATE: 16 BRPM | SYSTOLIC BLOOD PRESSURE: 124 MMHG | OXYGEN SATURATION: 100 % | DIASTOLIC BLOOD PRESSURE: 72 MMHG | HEART RATE: 74 BPM
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
## 2019-07-05 DIAGNOSIS — F41.9 ANXIETY DISORDER, UNSPECIFIED: ICD-10-CM
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
## 2019-07-05 DIAGNOSIS — F17.200 NICOTINE DEPENDENCE, UNSPECIFIED, UNCOMPLICATED: ICD-10-CM

## 2019-07-05 DIAGNOSIS — Z98.890 OTHER SPECIFIED POSTPROCEDURAL STATES: Chronic | ICD-10-CM

## 2019-07-05 DIAGNOSIS — F98.8 OTHER SPECIFIED BEHAVIORAL AND EMOTIONAL DISORDERS WITH ONSET USUALLY OCCURRING IN CHILDHOOD AND ADOLESCENCE: ICD-10-CM
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
## 2019-07-05 DIAGNOSIS — S43.005A UNSPECIFIED DISLOCATION OF LEFT SHOULDER JOINT, INITIAL ENCOUNTER: ICD-10-CM

## 2019-07-05 DIAGNOSIS — M25.512 PAIN IN LEFT SHOULDER: ICD-10-CM

## 2019-07-05 PROCEDURE — 99283 EMERGENCY DEPT VISIT LOW MDM: CPT

## 2019-07-05 PROCEDURE — 73030 X-RAY EXAM OF SHOULDER: CPT | Mod: 26,LT

## 2019-07-05 RX ORDER — ONDANSETRON 8 MG/1
4 TABLET, FILM COATED ORAL ONCE
Refills: 0 | Status: DISCONTINUED | OUTPATIENT
Start: 2019-07-05 | End: 2019-07-05

## 2019-07-05 RX ORDER — ONDANSETRON 8 MG/1
1 TABLET, FILM COATED ORAL
Qty: 20 | Refills: 0
Start: 2019-07-05

## 2019-07-05 RX ORDER — OXYCODONE HYDROCHLORIDE 5 MG/1
5 TABLET ORAL ONCE
Refills: 0 | Status: DISCONTINUED | OUTPATIENT
Start: 2019-07-05 | End: 2019-07-05

## 2019-07-05 RX ORDER — ONDANSETRON 8 MG/1
4 TABLET, FILM COATED ORAL ONCE
Refills: 0 | Status: COMPLETED | OUTPATIENT
Start: 2019-07-05 | End: 2019-07-05

## 2019-07-05 RX ADMIN — OXYCODONE HYDROCHLORIDE 5 MILLIGRAM(S): 5 TABLET ORAL at 18:57

## 2019-07-05 RX ADMIN — ONDANSETRON 4 MILLIGRAM(S): 8 TABLET, FILM COATED ORAL at 19:29

## 2019-07-05 NOTE — ED STATDOCS - NS ED ROS FT
Constitutional: No fever or chills  Eyes: No visual changes  HEENT: No throat pain  CV: No chest pain  Resp: No SOB no cough  GI: No abd pain, nausea or vomiting  : No dysuria  MSK: +left shoulder pain   Skin: No rash  Neuro: No headache

## 2019-07-05 NOTE — ED STATDOCS - CARE PROVIDER_API CALL
Mamadou Hernandez)  Orthopaedic Surgery  [address], [address]  [place] [address]  Phone: [phone]  Fax: [phone]  Follow Up Time:

## 2019-07-05 NOTE — ED ADULT TRIAGE NOTE - CHIEF COMPLAINT QUOTE
Pt reports to the ER complaining of left shoulder dislocation. Pt states that he previously dislocated his shoulder and had surgery on it it. Pt states that last night his shoulder dislocated and went back when he brought his arm down to his side, but that it continues to slip.

## 2019-07-05 NOTE — ED STATDOCS - NS_ ATTENDINGSCRIBEDETAILS _ED_A_ED_FT
I, Jermain Mcdaniel MD,  performed the initial face to face bedside interview with this patient regarding history of present illness, review of symptoms and relevant past medical, social and family history.  I completed an independent physical examination.  I was the initial provider who evaluated this patient.  The history, relevant review of systems, past medical and surgical history, medical decision making, and physical examination was documented by the scribe in my presence and I attest to the accuracy of the documentation.
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
## 2019-07-05 NOTE — ED STATDOCS - NSFOLLOWUPINSTRUCTIONS_ED_ALL_ED_FT
Shoulder Dislocation  Your shoulder joint is made up of 3 bones:    The upper arm bone (humerus).  The shoulder blade (scapula).  The collarbone (clavicle).    Shoulder dislocation happens when your upper arm bone moves out of its normal place in your shoulder joint.    Follow these instructions at home:  If you have a splint or sling:     Wear it as told by your doctor.  Take it off only as told by your doctor.  Loosen it if:    Your fingers become numb and tingly.  Your fingers turn cold and blue.    Keep it clean and dry.  Bathing     Do not take baths, swim, or use a hot tub until your doctor says you can. Ask your doctor if you can take showers. You may only be allowed to take sponge baths.  If your doctor says taking baths or showers is okay, cover your splint or sling with a plastic bag. Do not let the splint or sling get wet.  Managing pain, stiffness, and swelling     If told, put ice on the injured area.    Put ice in a plastic bag.  Place a towel between your skin and the bag.  Leave the ice on for 20 minutes, 2–3 times per day.    Move your fingers often to avoid stiffness and to lessen swelling.  ImageRaise (elevate) the injured area above the level of your heart while you are sitting or lying down.  Driving     Do not drive while you are wearing a splint or sling on a hand that you use for driving.  Do not drive or operate heavy machinery while taking pain medicine.  Activity     Return to your normal activities as told by your doctor. Ask your doctor what activities are safe for you.  Do range-of-motion exercises only as told by your doctor.  Exercise your hand by squeezing a soft ball. This keeps your hand and wrist from getting stiff and swollen.  General instructions     Take over-the-counter and prescription medicines only as told by your doctor.  Do not use any tobacco products, including cigarettes, chewing tobacco, or e-cigarettes. Tobacco can slow down healing. If you need help quitting, ask your doctor.  Keep all follow-up visits as told by your doctor. This is important.  Contact a doctor if:  Your splint or sling gets damaged.  Get help right away if:  Your pain gets worse instead of better.  You lose feeling in your arm or hand.  Your arm or hand turns white and cold.  This information is not intended to replace advice given to you by your health care provider. Make sure you discuss any questions you have with your health care provider.    PLEASE FOLLOW UP WITH DR. BUTCHER FOR ADDITIONAL IMAGING. PERCOCET AS NEEDED FOR PAIN. RETURN TO ED IN EVENT OF WORSENING SYMPTOMS.

## 2019-07-05 NOTE — ED STATDOCS - CLINICAL SUMMARY MEDICAL DECISION MAKING FREE TEXT BOX
23 y/o male with hx of multiple shoulder discoloration s/p fixation presents with left shoulder pain. Pt's arm was pulled backwards yesterday while swimming and has since felt the should pop in and out. Now with worsening pain. Here, shoulder appears to be located with normal pulses and normal sensation. Given extensive surgical hx of shoulder . Will obtain XR, ortho consult, reassess.
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
## 2020-07-13 NOTE — ED PROVIDER NOTE - ENMT NEGATIVE STATEMENT, MLM
No
Ears: no ear pain and no hearing problems.Nose: no nasal congestion and no nasal drainage.Mouth/Throat: no dysphagia, no hoarseness and no throat pain.Neck: no lumps, no pain, no stiffness and no swollen glands.

## 2020-10-17 ENCOUNTER — TRANSCRIPTION ENCOUNTER (OUTPATIENT)
Age: 25
End: 2020-10-17

## 2020-12-24 NOTE — ED PROVIDER NOTE - DURATION
[Well Developed] : well developed [NAD] : in no acute distress [PERRL] : pupils were equal, round, reactive to light  [Moist & Pink Mucous Membranes] : moist and pink mucous membranes [CTAB] : lungs clear to auscultation bilaterally [Regular Rate and Rhythm] : regular rate and rhythm [Normal S1, S2] : normal S1 and S2 [Soft] : soft  [Normal Bowel Sounds] : normal bowel sounds [No HSM] : no hepatosplenomegaly appreciated [Normal Tone] : normal tone [Well-Perfused] : well-perfused [Interactive] : interactive [icteric] : anicteric [Respiratory Distress] : no respiratory distress  [Distended] : non distended [Tender] : non tender [Edema] : no edema [Cyanosis] : no cyanosis [Rash] : no rash [Jaundice] : no jaundice [FreeTextEntry1] : Dysmorphic facial features [de-identified] : GT site dry and clean 2/day(s)

## 2021-02-02 ENCOUNTER — EMERGENCY (EMERGENCY)
Facility: HOSPITAL | Age: 26
LOS: 0 days | Discharge: HOME | End: 2021-02-02
Attending: HOSPITALIST
Payer: COMMERCIAL

## 2021-02-02 VITALS
HEART RATE: 93 BPM | SYSTOLIC BLOOD PRESSURE: 125 MMHG | DIASTOLIC BLOOD PRESSURE: 80 MMHG | TEMPERATURE: 97 F | RESPIRATION RATE: 18 BRPM | OXYGEN SATURATION: 99 %

## 2021-02-02 VITALS — HEIGHT: 72 IN | WEIGHT: 130.07 LBS

## 2021-02-02 DIAGNOSIS — M25.531 PAIN IN RIGHT WRIST: ICD-10-CM
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
## 2021-02-02 DIAGNOSIS — F41.9 ANXIETY DISORDER, UNSPECIFIED: ICD-10-CM

## 2021-02-02 DIAGNOSIS — G58.8 OTHER SPECIFIED MONONEUROPATHIES: ICD-10-CM

## 2021-02-02 DIAGNOSIS — S60.211A CONTUSION OF RIGHT WRIST, INITIAL ENCOUNTER: ICD-10-CM

## 2021-02-02 DIAGNOSIS — F98.8 OTHER SPECIFIED BEHAVIORAL AND EMOTIONAL DISORDERS WITH ONSET USUALLY OCCURRING IN CHILDHOOD AND ADOLESCENCE: ICD-10-CM

## 2021-02-02 DIAGNOSIS — G40.909 EPILEPSY, UNSPECIFIED, NOT INTRACTABLE, WITHOUT STATUS EPILEPTICUS: ICD-10-CM
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
## 2021-02-02 DIAGNOSIS — Z98.890 OTHER SPECIFIED POSTPROCEDURAL STATES: Chronic | ICD-10-CM

## 2021-02-02 DIAGNOSIS — Z91.030 BEE ALLERGY STATUS: ICD-10-CM

## 2021-02-02 PROCEDURE — 73120 X-RAY EXAM OF HAND: CPT | Mod: RT

## 2021-02-02 PROCEDURE — 73100 X-RAY EXAM OF WRIST: CPT | Mod: RT

## 2021-02-02 PROCEDURE — 99284 EMERGENCY DEPT VISIT MOD MDM: CPT | Mod: 25

## 2021-02-02 PROCEDURE — 99284 EMERGENCY DEPT VISIT MOD MDM: CPT

## 2021-02-02 PROCEDURE — 73090 X-RAY EXAM OF FOREARM: CPT | Mod: 26,RT

## 2021-02-02 PROCEDURE — 73090 X-RAY EXAM OF FOREARM: CPT | Mod: RT

## 2021-02-02 PROCEDURE — 73100 X-RAY EXAM OF WRIST: CPT | Mod: 26,RT

## 2021-02-02 PROCEDURE — 73120 X-RAY EXAM OF HAND: CPT | Mod: 26,RT

## 2021-02-02 RX ORDER — IBUPROFEN 200 MG
600 TABLET ORAL ONCE
Refills: 0 | Status: COMPLETED | OUTPATIENT
Start: 2021-02-02 | End: 2021-02-02

## 2021-02-02 RX ADMIN — Medication 600 MILLIGRAM(S): at 21:23

## 2021-02-02 NOTE — ED STATDOCS - OBJECTIVE STATEMENT
25 y/o male with PMHx of epilepsy on Keppra presents to the ED c/o right wrist injury. Pt is right hand dominant. States he slammed his hand down on table, catching the edge with the side of his wrist. c/o lateral wrist pain and pain into hand. Obvious swelling.

## 2021-02-02 NOTE — ED STATDOCS - CLINICAL SUMMARY MEDICAL DECISION MAKING FREE TEXT BOX
27 y/o male with right wrist injury. Pt is right hand dominant. Will obtain XR, ibuprofen for pain, and splint.

## 2021-02-02 NOTE — ED PROCEDURE NOTE - NS ED PERI VASCULAR NEG
Department of Anesthesiology  Postprocedure Note    Patient: Bipin Hayes  MRN: [id number]  YOB: 1971  Date of evaluation: 7/24/2020  Time:  2:50 PM     Procedure Summary     Date:  07/24/20 Room / Location:  [address]    Anesthesia Start:  0733 Anesthesia Stop:  9785    Procedure:  RIGHT KNEE ARTHROSCOPIC PARTIAL MEDIAL AND LATERAL MENISCECTOMY WITH CHONDROPLASTY WHEN PERFORMED: RIGHT KNEE ANTERIOR CRUCIATE LIGAMENT RECONSTRUCTION-POSTERIOR TIBIAL TENDON ALLOGRAFT (Right Knee) Diagnosis:       Acute lateral meniscus tear of right knee, initial encounter      Complex tear of medial meniscus of right knee as current injury, initial encounter      Sprain of anterior cruciate ligament of right knee, initial encounter      (Acute lateral meniscus tear of right knee, initial encounter [S83.281A] Complex tear of medial meniscus of right knee as current injury, initial encounter [S83.231A] Sprain of anterior cruciate ligament of right knee, initial encounter [F39.765H])    Surgeon:  Carolee Salgado MD Responsible Provider:  Khadijah Leon MD    Anesthesia Type:  general ASA Status:  2          Anesthesia Type: general    Maria Luz Phase I: Maria Luz Score: 10    Maria Luz Phase II: Maria Luz Score: 10    Last vitals: Reviewed and per EMR flowsheets.        Anesthesia Post Evaluation    Patient location during evaluation: PACU  Patient participation: complete - patient participated  Level of consciousness: awake  Pain score: 2  Airway patency: patent  Nausea & Vomiting: no nausea and no vomiting  Complications: no  Cardiovascular status: hemodynamically stable  Respiratory status: acceptable  Hydration status: euvolemic
fingers/toes warm to touch/no paresthesia/no swelling/no cyanosis of extremity/capillary refill time < 2 seconds

## 2021-02-02 NOTE — ED STATDOCS - PROGRESS NOTE DETAILS
No acute findings on xray.  Placed in ace wrap, reviewed RICE and ortho follow up -Sneha Ferris PA-C

## 2021-02-02 NOTE — ED STATDOCS - PATIENT PORTAL LINK FT
You can access the FollowMyHealth Patient Portal offered by [facility] by registering at the following website: [email or web address]. By joining [employer]’s FollowMyHealth portal, you will also be able to view your health information using other applications (apps) compatible with our system.

## 2021-02-02 NOTE — ED STATDOCS - CARE PROVIDER_API CALL
Skip Willis)  Orthopaedic Surgery; Surgery of the Hand  [address]  [place] [address]  Phone: [phone]  Fax: [phone]  Follow Up Time:

## 2021-02-02 NOTE — ED STATDOCS - NS_ ATTENDINGSCRIBEDETAILS _ED_A_ED_FT
Gema Brannon MD: The history, relevant review of systems, past medical and surgical history, medical decision making, and physical examination was documented by the scribe in my presence and I attest to the accuracy of the documentation.

## 2021-02-02 NOTE — ED ADULT NURSE NOTE - OBJECTIVE STATEMENT
Patient reports today slamming hand down on table and developed pain to wrist. denies any other injuries

## 2021-08-14 ENCOUNTER — EMERGENCY (EMERGENCY)
Facility: HOSPITAL | Age: 26
LOS: 0 days | Discharge: HOME | End: 2021-08-15
Attending: STUDENT IN AN ORGANIZED HEALTH CARE EDUCATION/TRAINING PROGRAM
Payer: COMMERCIAL

## 2021-08-14 VITALS — HEIGHT: 72 IN | WEIGHT: 130.07 LBS

## 2021-08-14 DIAGNOSIS — Z87.19 PERSONAL HISTORY OF OTHER DISEASES OF THE DIGESTIVE SYSTEM: ICD-10-CM

## 2021-08-14 DIAGNOSIS — Z98.890 OTHER SPECIFIED POSTPROCEDURAL STATES: Chronic | ICD-10-CM

## 2021-08-14 DIAGNOSIS — R31.9 HEMATURIA, UNSPECIFIED: ICD-10-CM

## 2021-08-14 DIAGNOSIS — N39.0 URINARY TRACT INFECTION, SITE NOT SPECIFIED: ICD-10-CM

## 2021-08-14 DIAGNOSIS — R56.9 UNSPECIFIED CONVULSIONS: ICD-10-CM

## 2021-08-14 DIAGNOSIS — F98.8 OTHER SPECIFIED BEHAVIORAL AND EMOTIONAL DISORDERS WITH ONSET USUALLY OCCURRING IN CHILDHOOD AND ADOLESCENCE: ICD-10-CM

## 2021-08-14 DIAGNOSIS — Z91.030 BEE ALLERGY STATUS: ICD-10-CM

## 2021-08-14 DIAGNOSIS — F41.9 ANXIETY DISORDER, UNSPECIFIED: ICD-10-CM

## 2021-08-14 DIAGNOSIS — Z88.8 ALLERGY STATUS TO OTHER DRUGS, MEDICAMENTS AND BIOLOGICAL SUBSTANCES STATUS: ICD-10-CM

## 2021-08-14 PROCEDURE — 80053 COMPREHEN METABOLIC PANEL: CPT

## 2021-08-14 PROCEDURE — 99284 EMERGENCY DEPT VISIT MOD MDM: CPT | Mod: 25

## 2021-08-14 PROCEDURE — 87086 URINE CULTURE/COLONY COUNT: CPT

## 2021-08-14 PROCEDURE — 36415 COLL VENOUS BLD VENIPUNCTURE: CPT

## 2021-08-14 PROCEDURE — 87491 CHLMYD TRACH DNA AMP PROBE: CPT

## 2021-08-14 PROCEDURE — 87186 SC STD MICRODIL/AGAR DIL: CPT

## 2021-08-14 PROCEDURE — 81001 URINALYSIS AUTO W/SCOPE: CPT

## 2021-08-14 PROCEDURE — 99285 EMERGENCY DEPT VISIT HI MDM: CPT

## 2021-08-14 PROCEDURE — 87591 N.GONORRHOEAE DNA AMP PROB: CPT

## 2021-08-14 PROCEDURE — 96372 THER/PROPH/DIAG INJ SC/IM: CPT

## 2021-08-14 PROCEDURE — 86703 HIV-1/HIV-2 1 RESULT ANTBDY: CPT

## 2021-08-14 PROCEDURE — 86780 TREPONEMA PALLIDUM: CPT

## 2021-08-14 PROCEDURE — 76770 US EXAM ABDO BACK WALL COMP: CPT

## 2021-08-14 PROCEDURE — 85025 COMPLETE CBC W/AUTO DIFF WBC: CPT

## 2021-08-14 NOTE — ED ADULT NURSE NOTE - NSICDXPASTMEDICALHX_GEN_ALL_CORE_FT
PAST MEDICAL HISTORY:  ADD (attention deficit disorder)     Anxiety     H/O inguinal hernia right    Injury of shoulder left labrum tear    Pinched nerve C5 and L5    Seizure x2.5 years

## 2021-08-14 NOTE — ED ADULT NURSE NOTE - OBJECTIVE STATEMENT
pt presents to the ED with hematuria and painful urination. pt states that it began today. denies fevers, chills, abd pain

## 2021-08-14 NOTE — ED ADULT TRIAGE NOTE - CHIEF COMPLAINT QUOTE
pt presents to ED c/o hematuria and penile pain since this AM. denies urinary hx. denies other discharge. denies testicular pain.

## 2021-08-15 VITALS
OXYGEN SATURATION: 100 % | TEMPERATURE: 98 F | DIASTOLIC BLOOD PRESSURE: 86 MMHG | HEART RATE: 73 BPM | RESPIRATION RATE: 17 BRPM | SYSTOLIC BLOOD PRESSURE: 130 MMHG

## 2021-08-15 LAB
ALBUMIN SERPL ELPH-MCNC: 4 G/DL — SIGNIFICANT CHANGE UP (ref 3.3–5)
ALP SERPL-CCNC: 44 U/L — SIGNIFICANT CHANGE UP (ref 40–120)
ALT FLD-CCNC: 27 U/L — SIGNIFICANT CHANGE UP (ref 12–78)
ANION GAP SERPL CALC-SCNC: 3 MMOL/L — LOW (ref 5–17)
APPEARANCE UR: ABNORMAL
AST SERPL-CCNC: 21 U/L — SIGNIFICANT CHANGE UP (ref 15–37)
BACTERIA # UR AUTO: ABNORMAL
BASOPHILS # BLD AUTO: 0 K/UL — SIGNIFICANT CHANGE UP (ref 0–0.2)
BASOPHILS NFR BLD AUTO: 0 % — SIGNIFICANT CHANGE UP (ref 0–2)
BILIRUB SERPL-MCNC: 1 MG/DL — SIGNIFICANT CHANGE UP (ref 0.2–1.2)
BILIRUB UR-MCNC: NEGATIVE — SIGNIFICANT CHANGE UP
BUN SERPL-MCNC: 13 MG/DL — SIGNIFICANT CHANGE UP (ref 7–23)
CALCIUM SERPL-MCNC: 8.9 MG/DL — SIGNIFICANT CHANGE UP (ref 8.5–10.1)
CHLORIDE SERPL-SCNC: 106 MMOL/L — SIGNIFICANT CHANGE UP (ref 96–108)
CO2 SERPL-SCNC: 28 MMOL/L — SIGNIFICANT CHANGE UP (ref 22–31)
COLOR SPEC: YELLOW — SIGNIFICANT CHANGE UP
CREAT SERPL-MCNC: 0.76 MG/DL — SIGNIFICANT CHANGE UP (ref 0.5–1.3)
DIFF PNL FLD: ABNORMAL
EOSINOPHIL # BLD AUTO: 0 K/UL — SIGNIFICANT CHANGE UP (ref 0–0.5)
EOSINOPHIL NFR BLD AUTO: 0 % — SIGNIFICANT CHANGE UP (ref 0–6)
EPI CELLS # UR: SIGNIFICANT CHANGE UP
GLUCOSE SERPL-MCNC: 81 MG/DL — SIGNIFICANT CHANGE UP (ref 70–99)
GLUCOSE UR QL: NEGATIVE MG/DL — SIGNIFICANT CHANGE UP
HCT VFR BLD CALC: 39.5 % — SIGNIFICANT CHANGE UP (ref 39–50)
HGB BLD-MCNC: 13.7 G/DL — SIGNIFICANT CHANGE UP (ref 13–17)
HIV 1 & 2 AB SERPL IA.RAPID: SIGNIFICANT CHANGE UP
KETONES UR-MCNC: ABNORMAL
LEUKOCYTE ESTERASE UR-ACNC: ABNORMAL
LYMPHOCYTES # BLD AUTO: 1.53 K/UL — SIGNIFICANT CHANGE UP (ref 1–3.3)
LYMPHOCYTES # BLD AUTO: 8 % — LOW (ref 13–44)
MANUAL SMEAR VERIFICATION: YES — SIGNIFICANT CHANGE UP
MCHC RBC-ENTMCNC: 29.7 PG — SIGNIFICANT CHANGE UP (ref 27–34)
MCHC RBC-ENTMCNC: 34.7 GM/DL — SIGNIFICANT CHANGE UP (ref 32–36)
MCV RBC AUTO: 85.5 FL — SIGNIFICANT CHANGE UP (ref 80–100)
MONOCYTES # BLD AUTO: 0.19 K/UL — SIGNIFICANT CHANGE UP (ref 0–0.9)
MONOCYTES NFR BLD AUTO: 1 % — LOW (ref 2–14)
NEUTROPHILS # BLD AUTO: 16.69 K/UL — HIGH (ref 1.8–7.4)
NEUTROPHILS NFR BLD AUTO: 87 % — HIGH (ref 43–77)
NITRITE UR-MCNC: NEGATIVE — SIGNIFICANT CHANGE UP
NRBC # BLD: 0 /100 — SIGNIFICANT CHANGE UP (ref 0–0)
NRBC # BLD: SIGNIFICANT CHANGE UP /100 WBCS (ref 0–0)
PH UR: 7 — SIGNIFICANT CHANGE UP (ref 5–8)
PLAT MORPH BLD: NORMAL — SIGNIFICANT CHANGE UP
PLATELET # BLD AUTO: 200 K/UL — SIGNIFICANT CHANGE UP (ref 150–400)
POTASSIUM SERPL-MCNC: 3.7 MMOL/L — SIGNIFICANT CHANGE UP (ref 3.5–5.3)
POTASSIUM SERPL-SCNC: 3.7 MMOL/L — SIGNIFICANT CHANGE UP (ref 3.5–5.3)
PROT SERPL-MCNC: 6.8 GM/DL — SIGNIFICANT CHANGE UP (ref 6–8.3)
PROT UR-MCNC: 30 MG/DL
RBC # BLD: 4.62 M/UL — SIGNIFICANT CHANGE UP (ref 4.2–5.8)
RBC # FLD: 12.3 % — SIGNIFICANT CHANGE UP (ref 10.3–14.5)
RBC BLD AUTO: NORMAL — SIGNIFICANT CHANGE UP
RBC CASTS # UR COMP ASSIST: ABNORMAL /HPF (ref 0–4)
SODIUM SERPL-SCNC: 137 MMOL/L — SIGNIFICANT CHANGE UP (ref 135–145)
SP GR SPEC: 1 — LOW (ref 1.01–1.02)
T PALLIDUM AB TITR SER: NEGATIVE — SIGNIFICANT CHANGE UP
UROBILINOGEN FLD QL: NEGATIVE MG/DL — SIGNIFICANT CHANGE UP
VARIANT LYMPHS # BLD: 4 % — SIGNIFICANT CHANGE UP (ref 0–6)
WBC # BLD: 19.18 K/UL — HIGH (ref 3.8–10.5)
WBC # FLD AUTO: 19.18 K/UL — HIGH (ref 3.8–10.5)
WBC UR QL: >50

## 2021-08-15 PROCEDURE — 76770 US EXAM ABDO BACK WALL COMP: CPT | Mod: 26

## 2021-08-15 RX ORDER — CEFPODOXIME PROXETIL 100 MG
1 TABLET ORAL
Qty: 20 | Refills: 0
Start: 2021-08-15 | End: 2021-08-24

## 2021-08-15 RX ORDER — CEFPODOXIME PROXETIL 100 MG
200 TABLET ORAL ONCE
Refills: 0 | Status: COMPLETED | OUTPATIENT
Start: 2021-08-15 | End: 2021-08-15

## 2021-08-15 RX ORDER — ACETAMINOPHEN 500 MG
975 TABLET ORAL ONCE
Refills: 0 | Status: COMPLETED | OUTPATIENT
Start: 2021-08-15 | End: 2021-08-15

## 2021-08-15 RX ORDER — IBUPROFEN 200 MG
600 TABLET ORAL ONCE
Refills: 0 | Status: COMPLETED | OUTPATIENT
Start: 2021-08-15 | End: 2021-08-15

## 2021-08-15 RX ORDER — PHENAZOPYRIDINE HCL 100 MG
2 TABLET ORAL
Qty: 12 | Refills: 0
Start: 2021-08-15 | End: 2021-08-16

## 2021-08-15 RX ORDER — CEFTRIAXONE 500 MG/1
250 INJECTION, POWDER, FOR SOLUTION INTRAMUSCULAR; INTRAVENOUS ONCE
Refills: 0 | Status: COMPLETED | OUTPATIENT
Start: 2021-08-15 | End: 2021-08-15

## 2021-08-15 RX ADMIN — Medication 600 MILLIGRAM(S): at 02:26

## 2021-08-15 RX ADMIN — Medication 200 MILLIGRAM(S): at 03:17

## 2021-08-15 RX ADMIN — Medication 600 MILLIGRAM(S): at 00:30

## 2021-08-15 RX ADMIN — CEFTRIAXONE 250 MILLIGRAM(S): 500 INJECTION, POWDER, FOR SOLUTION INTRAMUSCULAR; INTRAVENOUS at 02:26

## 2021-08-15 RX ADMIN — Medication 975 MILLIGRAM(S): at 02:26

## 2021-08-15 RX ADMIN — Medication 975 MILLIGRAM(S): at 00:31

## 2021-08-15 NOTE — ED STATDOCS - OBJECTIVE STATEMENT
Giovana PISANO: 26M hx seizure on keppra, presents with a cc of dysuria and urethral pain and mild suprapubic pain x1 day, took advil this morning w some improvement never had episode like this before, no fever, no rash, no discharge, no testicular pain or swelling, no fever. last sexual encounter x1 year ago - agrees w std screen today also notes mild L flank pain, does a lot of heavy lifting for work, thinks pain may have gotten worse today. able to walk. tolerating PO w/o issue. Denies n/v/f/c/cp/sob. Denies headache, syncope, lightheadedness, dizziness. Denies chest palpitations, Denies edema. Denies dysuria, hematuria, BRBPR, tarry stools, diarrhea, constipation. Denies  urethral discharge. Giovana PISANO: 26M hx seizure on keppra, presents with a cc of dysuria and urethral pain and mild suprapubic pain x1 day, took advil this morning w some improvement never had episode like this before, no fever, no rash, no discharge, no testicular pain or swelling, no fever. last sexual encounter x1 year ago - agrees w std screen today also notes mild L flank pain, does a lot of heavy lifting for work, thinks pain may have gotten worse today. able to walk. tolerating PO w/o issue. Denies n/v/f/c/cp/sob. Denies headache, syncope, lightheadedness, dizziness. Denies chest palpitations, Denies edema. Denies BRBPR, tarry stools, diarrhea, constipation. Denies  urethral discharge.

## 2021-08-15 NOTE — ED STATDOCS - PROGRESS NOTE DETAILS
Giovana PISANO: Pt signed out to my colleague Dr. ARIANNA Valencia pending labs, clinical reassessment and dispo planning.

## 2021-08-15 NOTE — ED STATDOCS - PATIENT PORTAL LINK FT
CBC is normal  
You can access the FollowMyHealth Patient Portal offered by [facility] by registering at the following website: [email or web address]. By joining [employer]’s FollowMyHealth portal, you will also be able to view your health information using other applications (apps) compatible with our system.

## 2021-08-15 NOTE — ED STATDOCS - CLINICAL SUMMARY MEDICAL DECISION MAKING FREE TEXT BOX
Giovana PISANO: 26M hx seizure on keppra, presents with a cc of dysuria and urethral pain and mild suprapubic pain x1 day, took advil this morning w some improvement never had episode like this before, no fever, no rash, no discharge, no testicular pain or swelling, no fever. last sexual encounter x1 year ago - agrees w std screen today also notes mild L flank pain, does a lot of heavy lifting for work, thinks pain may have gotten worse today. able to walk. tolerating PO w/o issue. exam vss non toxic PE as above ddx c/f UTI/pyelo also c/f possible std, low c/f testicular pathology given normal exam, low c/f stone given pt comfortable appearing, no cva ttp, will check labs urine screening renal sono pain meds, reassess thereafter.

## 2021-08-15 NOTE — ED STATDOCS - PHYSICAL EXAMINATION
Giovana PISANO:  VITALS: Initial triage and subsequent vitals have been reviewed by me.  GEN APPEARANCE: WDWN, alert and cooperative, non-toxic appearing and in NAD  HEAD: Atraumatic, normocephalic   EYES: PERRLa, EOMI, vision grossly intact.   EARS: Gross hearing intact.   NOSE: No nasal discharge, no external evidence of epistaxis.   NECK: Supple  CV: RRR, S1S2, no c/r/m/g. No cyanosis or pallor. Extremities warm, well perfused. Cap refill <2 seconds. No bruits.   LUNGS: CTAB. No wheezing. No rales. No rhonchi. No diminished breath sounds.   ABDOMEN: +mild suprapubic ttp  MSK/EXT: Spine appears normal, no spine point tenderness. No CVA ttp. Normal muscular development. Pelvis stable. No obvious joint or bony deformity, no peripheral edema.   NEURO: Alert, follows commands. Weight bearing normal. Speech normal. Sensation and motor normal x4 extremities.   SKIN: Normal color for race, warm, dry and intact. No evidence of rash.  PSYCH: Normal mood and affect.   Exam (Male): Un-Circumcised penis, external anatomy appears normal, no e/o priapism, no e/o trama. No testicular swelling, erythema, or tenderness. No urethral discharge or bleeding. Cremasteric reflex intact b/l.  EXAM WITH: Susy STONER

## 2021-08-15 NOTE — ED STATDOCS - CARE PROVIDER_API CALL
Oz Hunter)  Family Medicine  [address] [place] [address]  Phone: [phone]  Fax: [phone]  Follow Up Time: 1-3 Days

## 2021-08-16 LAB
C TRACH RRNA SPEC QL NAA+PROBE: SIGNIFICANT CHANGE UP
N GONORRHOEA RRNA SPEC QL NAA+PROBE: SIGNIFICANT CHANGE UP
SPECIMEN SOURCE: SIGNIFICANT CHANGE UP
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
## 2021-09-24 NOTE — ED PROVIDER NOTE - TEMPLATE
Pt stating she does not want any work up if she is going to get a hospital bill. PAR informed pt that since she has [employer] she is out of network and could possibly get a bill. Pt requesting to leave and seek medical treatment in [place]. DC instructions reviewed with pt. Pt verbalized understanding. Pt ambulated to [facility] with steady gait.    Abdominal Pain, N/V/D

## 2021-10-21 NOTE — ED STATDOCS - PHYSICAL EXAMINATION
Wrote patient via portal, waiting for response.   Constitutional: mild distress AAOx3  Eyes: PERRLA EOMI  Head: Normocephalic atraumatic  Mouth: MMM  Cardiac: regular rate   Resp: Lungs CTAB  GI: Abd s/nt/nd  Neuro: CN2-12 intact  Skin: No rashes  MSK: +TTP over left shoulder, normal sensation, normal peripheral pulses, no redness or swelling to joint .

## 2021-12-12 ENCOUNTER — EMERGENCY (EMERGENCY)
Facility: HOSPITAL | Age: 26
LOS: 0 days | Discharge: HOME | End: 2021-12-12
Attending: EMERGENCY MEDICINE
Payer: OTHER MISCELLANEOUS

## 2021-12-12 VITALS
TEMPERATURE: 98 F | OXYGEN SATURATION: 99 % | DIASTOLIC BLOOD PRESSURE: 77 MMHG | SYSTOLIC BLOOD PRESSURE: 121 MMHG | RESPIRATION RATE: 18 BRPM | HEART RATE: 80 BPM

## 2021-12-12 VITALS — HEIGHT: 72 IN | WEIGHT: 130.07 LBS

## 2021-12-12 DIAGNOSIS — Z91.030 BEE ALLERGY STATUS: ICD-10-CM

## 2021-12-12 DIAGNOSIS — M54.9 DORSALGIA, UNSPECIFIED: ICD-10-CM

## 2021-12-12 DIAGNOSIS — Z98.890 OTHER SPECIFIED POSTPROCEDURAL STATES: Chronic | ICD-10-CM

## 2021-12-12 DIAGNOSIS — R51.9 HEADACHE, UNSPECIFIED: ICD-10-CM

## 2021-12-12 DIAGNOSIS — S16.1XXA STRAIN OF MUSCLE, FASCIA AND TENDON AT NECK LEVEL, INITIAL ENCOUNTER: ICD-10-CM
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
## 2021-12-12 DIAGNOSIS — M54.2 CERVICALGIA: ICD-10-CM
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
## 2021-12-12 DIAGNOSIS — Z88.8 ALLERGY STATUS TO OTHER DRUGS, MEDICAMENTS AND BIOLOGICAL SUBSTANCES STATUS: ICD-10-CM

## 2021-12-12 PROCEDURE — 70450 CT HEAD/BRAIN W/O DYE: CPT | Mod: 26,MA

## 2021-12-12 PROCEDURE — 99285 EMERGENCY DEPT VISIT HI MDM: CPT

## 2021-12-12 PROCEDURE — 72125 CT NECK SPINE W/O DYE: CPT | Mod: 26,MA

## 2021-12-12 PROCEDURE — 99284 EMERGENCY DEPT VISIT MOD MDM: CPT | Mod: 25

## 2021-12-12 PROCEDURE — 70450 CT HEAD/BRAIN W/O DYE: CPT | Mod: MA

## 2021-12-12 PROCEDURE — 72125 CT NECK SPINE W/O DYE: CPT | Mod: MA

## 2021-12-12 RX ORDER — ACETAMINOPHEN 500 MG
1000 TABLET ORAL ONCE
Refills: 0 | Status: COMPLETED | OUTPATIENT
Start: 2021-12-12 | End: 2021-12-12

## 2021-12-12 RX ADMIN — Medication 1000 MILLIGRAM(S): at 15:07
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
## 2021-12-12 NOTE — ED STATDOCS - PATIENT PORTAL LINK FT
You can access the FollowMyHealth Patient Portal offered by [facility] by registering at the following website: [email or web address]. By joining [employer]’s FollowMyHealth portal, you will also be able to view your health information using other applications (apps) compatible with our system.
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
## 2021-12-12 NOTE — ED STATDOCS - MUSCULOSKELETAL, MLM
+b/l paracervical TTP, +mild midline cervical TTP, no step off deformity, normal ROM of neck. No reproducible thoracic or lumbar TTP.
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
## 2021-12-16 ENCOUNTER — EMERGENCY (EMERGENCY)
Facility: HOSPITAL | Age: 26
LOS: 0 days | Discharge: HOME | End: 2021-12-16
Attending: EMERGENCY MEDICINE
Payer: OTHER MISCELLANEOUS

## 2021-12-16 VITALS — HEIGHT: 72 IN | WEIGHT: 130.07 LBS

## 2021-12-16 VITALS
DIASTOLIC BLOOD PRESSURE: 72 MMHG | OXYGEN SATURATION: 96 % | SYSTOLIC BLOOD PRESSURE: 114 MMHG | HEART RATE: 85 BPM | TEMPERATURE: 98 F | RESPIRATION RATE: 16 BRPM

## 2021-12-16 DIAGNOSIS — Z88.8 ALLERGY STATUS TO OTHER DRUGS, MEDICAMENTS AND BIOLOGICAL SUBSTANCES: ICD-10-CM
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
## 2021-12-16 DIAGNOSIS — Z91.030 BEE ALLERGY STATUS: ICD-10-CM

## 2021-12-16 DIAGNOSIS — Z98.890 OTHER SPECIFIED POSTPROCEDURAL STATES: Chronic | ICD-10-CM

## 2021-12-16 DIAGNOSIS — M25.512 PAIN IN LEFT SHOULDER: ICD-10-CM

## 2021-12-16 PROCEDURE — 99283 EMERGENCY DEPT VISIT LOW MDM: CPT | Mod: 25

## 2021-12-16 PROCEDURE — 73030 X-RAY EXAM OF SHOULDER: CPT | Mod: LT

## 2021-12-16 PROCEDURE — 73030 X-RAY EXAM OF SHOULDER: CPT | Mod: 26,LT

## 2021-12-16 PROCEDURE — 99284 EMERGENCY DEPT VISIT MOD MDM: CPT

## 2021-12-16 RX ORDER — ACETAMINOPHEN 500 MG
650 TABLET ORAL ONCE
Refills: 0 | Status: COMPLETED | OUTPATIENT
Start: 2021-12-16 | End: 2021-12-16
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
## 2021-12-16 NOTE — ED PROVIDER NOTE - PHYSICAL EXAMINATION
GENERAL: no acute distress, non-toxic appearing  HEENT: normal conjunctiva, oral mucosa moist  CARDIAC: regular rate and regular rhythm, normal S1 and S2, no appreciable murmurs  PULM: clear to ascultation bilaterally, no incr wob, sats well on RA  GI: abdomen nondistended, soft, nontender  : no suprapubic tenderness  NEURO: alert and oriented x 3, normal speech, moving all extremities without lateralization  MSK: no visible deformities, no peripheral edema, L anterior shoulder pain at site subacromion, pt with some pain with resisted extension cw impingement  SKIN: no visible rashes/lacs  PSYCH: appropriate mood and affect GENERAL: no acute distress, non-toxic appearing  HEENT: normal conjunctiva, oral mucosa moist  CARDIAC: regular rate and regular rhythm, normal S1 and S2, no appreciable murmurs  PULM: clear to ascultation bilaterally, no incr wob, sats well on RA  GI: abdomen nondistended, soft, nontender  : no suprapubic tenderness  NEURO: alert and oriented x 3, normal speech, moving all extremities without lateralization  MSK: no visible deformities, no peripheral edema, L anterior shoulder pain at site subacromion, pt with some pain with resisted extension cw impingement  SKIN: no visible rashes/lacs  PSYCH: appropriate mood and affect    Agree with above. Kulwant Ryder D.O.
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
## 2021-12-16 NOTE — ED PROVIDER NOTE - PATIENT PORTAL LINK FT
You can access the FollowMyHealth Patient Portal offered by [facility] by registering at the following website: [email or web address]. By joining [employer]’s FollowMyHealth portal, you will also be able to view your health information using other applications (apps) compatible with our system.

## 2021-12-16 NOTE — ED PROVIDER NOTE - NSFOLLOWUPINSTRUCTIONS_ED_ALL_ED_FT
- Please follow up with your Primary Care Doctor within 48 hours. Bring your results from today.    - Please follow up with your orthopedist. Bring results from today.     - Tylenol up to 650 mg every 6 hours as needed for pain and/or Ibuprofen up to 400 mg every 6 hours as needed for pain.    - Be sure to return to the ED if you develop new, worsening, or any distressing symptoms.

## 2022-07-16 NOTE — ASU DISCHARGE PLAN (ADULT/PEDIATRIC). - LAUNCH MEDICATION RECONCILIATION
<<-----Click here for Discharge Medication Review 1L fluid restriction *** 1L fluid restriction/ 24 hrs   Repeat INR on 7/21-Keep INR 2-2.5  Repeat CBC, CMP on Monday Next week for Anemia follow up & K , BUN/Cr  level,

## 2022-08-02 NOTE — ED STATDOCS - NS ED NOTE AC HIGH RISK COUNTRIES
Continue to keep the area clean and dry, avoid stools, ponds, legs, and standing water until glue falls off in about 4-8 days.  Avoid when minutes, lotions on glue as this will break it down prematurely.  Return to the ER immediately if you have worsening redness, swelling, drainage, inability to move the thumb, streaking up the arm, fevers or chills or per your best judgment  
No

## 2022-10-10 NOTE — ED ADULT NURSE NOTE - BREATHING, MLM
Application of Fluoride Varnish    Dental health HIGH risk factors: none    Contraindications: None present- fluoride varnish applied    Dental Fluoride Varnish and Post-Treatment Instructions: Reviewed with parents   used: No    Dental Fluoride applied to teeth by: MA/LPN/RN  Fluoride was well tolerated    LOT #: [id number]  EXPIRATION DATE:  3/6/2024    Next treatment due:  Next well child visit    Yvette Hudson, CMA         Spontaneous, unlabored and symmetrical

## 2022-10-19 NOTE — ED ADULT NURSE NOTE - CCCP TRG CHIEF CMPLNT
Patient is an employee of [employer] and states that she is doing annual enrollment.  Per pt she was putting in the short/long term disability and thought she would need statement.  This is not known information.  Pt is going to check with benefits team and insurance to see if a form or anything needs to be filed. She does not have any condition at this time in which she is requesting short/long term disability or FMLA.  Nothing further needed at this time.  She will call if she does need a form completed.   wrist pain/injury

## 2023-06-16 NOTE — ED ADULT TRIAGE NOTE - NSWEIGHTCALCTOOLDRUG_GEN_A_CORE
[facility], [place]  GALILEO Consult      Heri Muhammad  YOB: 1936  Medical Record Number:  [id number]  Attending Physician:  Hema Del Valle MD   Date of Admission: 6/16/2023       Hospital Day:  0  Current Principal Problem:  <principal problem not specified>      History     Cc: atrial fibrillation     HPI  Mr Heri Muhammad is an 86 year old male with PMH of atrial fibrillation who underwent Watchman device implant on 5/5/23. He presents today for 6 week follow up GALILEO.       Anticoagulant/antiplatelets: Apixaban 5 mg BID        Dysphagia or odynophagia:  No   Liver Disease, esophageal disease, or known varices:  No   Upper GI Bleeding: No   Snoring:  No   Sleep Apnea:  No   Prior neck surgery or radiation:  No   History of anesthetic difficulties:  No   Family history of anesthetic difficulties:  No   Last oral intake: Yesterday Pm   Able to move neck in all directions: Yes   Platelet count: 189k  INR: 0.9        Medications - Outpatient  Prior to Admission medications    Medication Sig Start Date End Date Taking? Authorizing Provider   apixaban (ELIQUIS) 5 mg Tab Take 1 tablet (5 mg total) by mouth 2 (two) times daily. 8/16/23   Ivonne Robles MD   atorvastatin (LIPITOR) 40 MG tablet TAKE 1 TABLET EVERY DAY 7/29/22   Naz Ruth MD   cetirizine (ZYRTEC) 10 MG tablet Take 10 mg by mouth every evening.    Historical Provider   citalopram (CELEXA) 20 MG tablet TAKE 1 TABLET EVERY DAY 12/9/22   Anjum Arellano MD   cyclobenzaprine (FLEXERIL) 5 MG tablet Take 5 mg by mouth nightly. prn    Historical Provider   lamoTRIgine (LAMICTAL) 100 MG tablet TAKE 1 TABLET TWICE DAILY 1/31/23   Anjum Arellano MD   memantine (NAMENDA) 10 MG Tab TAKE 1 TABLET TWICE DAILY 1/19/23   Anjum Arellano MD   metoprolol succinate (TOPROL-XL) 50 MG 24 hr tablet Take 1 tablet (50 mg total) by mouth once daily. 8/24/22 8/24/23  Deep Esparza MD   primidone (MYSOLINE) 250 MG Tab TAKE 2 TABLETS TWICE  DAILY 4/17/23   Anjum Arellano MD   STIOLTO RESPIMAT 2.5-2.5 mcg/actuation Mist INHALE 2 PUFFS INTO THE LUNGS ONCE DAILY. 1/31/23 1/31/24  Ivonne Robles MD   torsemide (DEMADEX) 10 MG Tab TAKE 1 TABLET (10 MG TOTAL) BY MOUTH EVERY MORNING. 8/15/22   Naz Ruth MD         Medications - Current  Scheduled Meds:  Continuous Infusions:  PRN Meds:.      Allergies  Review of patient's allergies indicates:   Allergen Reactions    Bactroban [mupirocin calcium] Rash     Other reaction(s): Rash         Past Medical History  Past Medical History:   Diagnosis Date    Anticoagulant long-term use     eliquis    Aortic atherosclerosis 9/18/2017    - LDL goal <70 - BP goal <130/80    Atrial flutter     Benign essential tremor     Biventricular cardiac pacemaker in situ 9/24/2018    CAD (coronary artery disease) 12/2/2013    - 9/2013 Left heart Cath: LAD- 60% prox (neg FFR), nl, ramus and RCA 20% - LDL goal <70 - BP goal <130/80    Cancer     skin cancer on back    Carpal tunnel syndrome of left wrist 8/8/2017    Chronic diastolic heart failure 1/19/2018    Chronic obstructive pulmonary disease 10/3/2018    Chronic right-sided thoracic back pain 4/27/2017    Cough     Current use of long term anticoagulation 6/2/2016    - Eliquis for Afib    Dizziness     intermittant    DJD (degenerative joint disease) of knee     ED (erectile dysfunction)     Hard of hearing 11/2016    History of cataract     Hyponatremia 6/11/2019    Memory loss     Mixed hyperlipidemia 7/28/2012    Persistent atrial fibrillation 7/1/2015    - followed by Dr. Esparza - history of cardioversion - on Eliquis OAC 5 mg BID    Seizure disorder     Seizures     last episode 1995    Subclinical hypothyroidism     Tension type headache 7/2/2019         Past Surgical History  Past Surgical History:   Procedure Laterality Date    ANKLE SURGERY Right     pins and screws    ATRIAL ABLATION SURGERY      CARDIAC PACEMAKER PLACEMENT  2014    CARDIAC PACEMAKER  PLACEMENT      CARDIAC PACEMAKER PLACEMENT      CARDIOVERSION      CATARACT EXTRACTION      OU    COLONOSCOPY      due in     EYE SURGERY Bilateral     cataracts extraction    FRACTURE SURGERY Right     ankle with hardware    HERNIA REPAIR      abdominal    INJECTION OF ANESTHETIC AGENT AROUND MEDIAL BRANCH NERVES INNERVATING LUMBAR FACET JOINT Bilateral 2019    Procedure: Block-nerve-medial branch-lumbar L2-L5;  Surgeon: Anthony Moffett MD;  Location: [facility] OR;  Service: Pain Management;  Laterality: Bilateral;    JOINT REPLACEMENT Bilateral     shoulders    OCCLUSION OF LEFT ATRIAL APPENDAGE N/A 2023    Procedure: Left atrial appendage occlusion;  Surgeon: Hema Del Valle MD;  Location: [facility] EP LAB;  Service: Cardiology;  Laterality: N/A;  afib, watchman, BSCI, ben, anes, MB, 3prep    RADIOFREQUENCY ABLATION OF LUMBAR MEDIAL BRANCH NERVE AT SINGLE LEVEL Bilateral 2019    Procedure: Radiofrequency Ablation, Nerve, Spinal, Lumbar, Medial Branch, L2-L5;  Surgeon: Anthony Moffett MD;  Location: [facility] OR;  Service: Pain Management;  Laterality: Bilateral;    TRANSESOPHAGEAL ECHOCARDIOGRAPHY N/A 2023    Procedure: ECHOCARDIOGRAM, TRANSESOPHAGEAL;  Surgeon: Dylan Layne MD;  Location: [facility] EP LAB;  Service: Cardiology;  Laterality: N/A;    TREATMENT OF CARDIAC ARRHYTHMIA N/A 2022    Procedure: Cardioversion or Defibrillation;  Surgeon: MIREILLE Morel MD;  Location: [facility] EP LAB;  Service: Cardiology;  Laterality: N/A;  AF, BEN, DCCV, MAC, EH (to cover for SK), 3 Prep *SJM CRT-P in situ*         Social History  Social History     Socioeconomic History    Marital status:    Occupational History    Occupation: retired   Tobacco Use    Smoking status: Former     Packs/day: 1.00     Years: 5.00     Pack years: 5.00     Types: Cigarettes     Quit date:      Years since quittin.4    Smokeless tobacco: Never   Substance and Sexual Activity    Alcohol use: No    Drug use: No     Sexual activity: Not Currently     Partners: Female   Social History Narrative    . 4 adult children.      Social Determinants of Health     Financial Resource Strain: Low Risk     Difficulty of Paying Living Expenses: Not hard at all   Food Insecurity: No Food Insecurity    Worried About Running Out of Food in the Last Year: Never true    Ran Out of Food in the Last Year: Never true   Transportation Needs: No Transportation Needs    Lack of Transportation (Medical): No    Lack of Transportation (Non-Medical): No   Physical Activity: Inactive    Days of Exercise per Week: 0 days    Minutes of Exercise per Session: 0 min   Stress: No Stress Concern Present    Feeling of Stress : Not at all   Social Connections: Socially Isolated    Frequency of Communication with Friends and Family: Never    Frequency of Social Gatherings with Friends and Family: Twice a week    Attends [employer] Services: Never    Active Member of Clubs or Organizations: No    Attends Club or Organization Meetings: Never    Marital Status:    Housing Stability: Low Risk     Unable to Pay for Housing in the Last Year: No    Number of Places Lived in the Last Year: 1    Unstable Housing in the Last Year: No         ROS  10 point ROS performed and negative except as stated in HPI     Physical Examination         Vital Signs             24 Hour VS Range       No intake or output data in the 24 hours ending 06/16/23 1022      Physical Exam:   Constitutional: no acute distress  HEENT: NCAT, EOMI, no scleral icterus  Cardiovascular: Regular rate and rhythm  Pulmonary: Normal respiratory effort   Abdomen: nontender, non-distended   Neuro: alert and oriented, no focal deficits  Extremities: warm, no edema   MSK: no deformities  Integument: intact, no rashes       Data       No results for input(s): WBC, HGB, HCT, PLT in the last 168 hours.     No results for input(s): PROTIME, INR in the last 168 hours.     No results for input(s): NA, K,  CL, CO2, BUN, CREATININE, ANIONGAP, CALCIUM in the last 168 hours.     No results for input(s): PROT, ALBUMIN, BILITOT, ALKPHOS, AST, ALT in the last 168 hours.     No results for input(s): TROPONINI in the last 168 hours.     NT-pro-BNP  ([place]) (pg/mL)   Date Value   11/27/2015 458     BNP (pg/mL)   Date Value   12/04/2020 91   08/29/2018 92   10/13/2015 119 (H)       No results for input(s): LABBLOO in the last 168 hours.         Assessment & Plan   Mr Heri Muhammad is an 86 year old male with PMH of atrial fibrillation who underwent Watchman device implant on 5/5/23. He presents today for 6 week follow up GALILEO.    Atrial fibrillation  S/p watchman implant:   To echo lab for GALILEO.   -No absolute contraindications of esophageal stricture, tumor, perforation, laceration,or diverticulum and/or active GI bleed  -The risks, benefits & alternatives of the procedure were explained to the patient.   -The risks of transesophageal echo include but are not limited to:  Dental trauma, esophageal trauma/perforation, bleeding, laryngospasm/brochospasm, aspiration, sore throat/hoarseness, & dislodgement of the endotracheal tube/nasogastric tube (where applicable).    -The risks of moderate sedation include hypotension, respiratory depression, arrhythmias, bronchospasm, & death.    -Informed consent was obtained. The patient is agreeable to proceed with the procedure and all questions and concerns addressed      Frederick Lester MD  [facility]   Cardiovascular Disease PGY-V      used

## 2023-08-17 NOTE — ASU PREOP CHECKLIST - PATIENT'S PERSONAL PROPERTY GIVEN TO
[FreeTextEntry1] : 8/17/23-  detailed message left for patient- pelvic US- results below- advised gyn follow up.   IMPRESSION: Exact comparisons to the prior ultrasound and MRI are limited due to differences in techniques. Bilateral complex ovarian nodule/cysts again concerning for involuting hemorrhagic cysts versus the possibility of endometrioma for which clinical correlation is advised. 
on unit/6A

## 2023-10-01 NOTE — ED STATDOCS - PRINCIPAL DIAGNOSIS
[facility]  Urgent Care Encounter      CHIEF COMPLAINT       Chief Complaint   Patient presents with    Sinus Problem       Nurses Notes reviewed and I agree except as noted in the HPI. HISTORY OFPRESENT ILLNESS   Tanmay Mccallum is a 72 y.o. The history is provided by the patient. No  was used. Sinusitis  Pain details:     Location:  Maxillary    Quality:  Pressure    Severity:  Severe    Duration:  2 weeks    Timing:  Intermittent  Progression:  Worsening  Chronicity:  New  Context: recent URI    Context: not allergies, not chemical odor, not deviated nasal septum and not smoke inhalation    Relieved by:  Nothing  Worsened by:  Lying down  Ineffective treatments:  Oral decongestants and acetaminophen  Associated symptoms: congestion, cough, headaches, mouth breathing, rhinorrhea and sore throat    Associated symptoms: no chest pain, no chills, no ear pain, no fatigue, no fever, no hoarse voice, no nausea, no shortness of breath, no sneezing, no snoring, no swollen glands, no tooth pain, no vertigo, no vomiting and no wheezing    Risk factors: no allergic reaction, no asthma, no BiPAP, no COPD, no CPAP use, no cystic fibrosis, no diabetes, no immune deficiency, no nasal cannula, no nasal polyps and no smoke exposure        REVIEW OF SYSTEMS     Review of Systems   Constitutional:  Negative for activity change, appetite change, chills, diaphoresis, fatigue and fever. HENT:  Positive for congestion, postnasal drip, rhinorrhea, sinus pressure and sore throat. Negative for ear pain, hoarse voice and sneezing. Respiratory:  Positive for cough. Negative for apnea, snoring, choking, chest tightness, shortness of breath, wheezing and stridor. Cardiovascular:  Negative for chest pain, palpitations and leg swelling. Gastrointestinal:  Negative for nausea and vomiting. Neurological:  Positive for headaches. Negative for dizziness, vertigo and light-headedness. Insect sting

## 2024-04-10 NOTE — ED STATDOCS - MEDICAL DECISION MAKING DETAILS
Cardiology Clinic note    Subjective:   Patient ID:  Cesar Simpson is a 75 y.o. male who presents for evaluation of AS, PAD    76 yo M with hx of DM2, HTN, HLD, FABY present to clinic for evaluation of newly diagnosed mod-sev AS, PAD.     4/10/2024: Patient seen in clinic today, reports he's had a difficult past year. He reports over the past year he has been experiencing worsening, ongoing fatigue, dizziness, and BASILIO. Has not been very active the past year due to BASILIO, cough, and worsening fatigue; he used to ride his stationary bike and walk frequently. Long conversation with patient and Dr. Vickers concerning aortic stenosis diagnosis and noted CAD on CT last month. Patient denies CP, SOB, orthopnea, PND, syncope, palpitations, LE edema; denies any LE claudication. Endorses pre-syncope/dizziness and ongoing BASILIO/fatigue. Reports compliance and tolerance with all medications.      Echo 4/3/2024    Left Ventricle: The left ventricle is normal in size. Normal wall thickness. There is concentric remodeling. There is normal systolic function. Biplane (2D) method of discs ejection fraction is 58%. There is normal diastolic function.    Right Ventricle: Normal right ventricular cavity size. Wall thickness is normal. Right ventricle wall motion  is normal. Systolic function is normal.    Aortic Valve: There is moderate aortic valve sclerosis. Moderately restricted motion. There is moderate to severe stenosis. Aortic valve area by VTI is 0.90 cm². Aortic valve peak velocity is 3.18 m/s. Mean gradient is 24 mmHg. The dimensionless index is 0.29.    Mitral Valve: There is moderate mitral annular calcification present.    Pulmonary Artery: The estimated pulmonary artery systolic pressure is 32 mmHg.    IVC/SVC: Normal venous pressure at 3 mmHg.    US LOWER EXTREMITY ARTERIES BILATERAL 3/25/2024  FINDINGS:  Right mid BERNABE is occluded with large amount plaque.  Left proximal BERNABE is occluded with large amounts of plaque.      Impression:  BERNABE occlusions bilaterally.    Past Medical History:   Diagnosis Date    Coronary artery disease of native artery of native heart with stable angina pectoris 4/10/2024    Diabetes mellitus type II, uncontrolled 02/27/2013    High-density lipoprotein deficiency 02/27/2013    HTN (hypertension) 02/27/2013    Hyperlipidemia     Hypothyroidism     Moderate obstructive sleep apnea 6/29/2022    Normocytic anemia 06/28/2021    Obesity     Osteoarthritis 02/08/2016    PAD (peripheral artery disease) 4/10/2024    Trouble in sleeping     Type 2 diabetes mellitus     Type 2 diabetes mellitus with diabetic polyneuropathy, without long-term current use of insulin           Patient Active Problem List    Diagnosis Date Noted    Nonrheumatic aortic valve stenosis 04/10/2024     Moderate to Severe       PAD (peripheral artery disease) 04/10/2024    Coronary artery disease of native artery of native heart with stable angina pectoris 04/10/2024    ILD (interstitial lung disease) 03/26/2024    Chronic cough 03/26/2024    Moderate obstructive sleep apnea 06/29/2022    Does use hearing aid 06/28/2021    Normocytic anemia 06/28/2021    Diet-controlled diabetes mellitus 09/24/2020    BPH associated with nocturia 09/24/2020    Arthritis 09/24/2020    Insomnia 05/23/2019    Hypertension associated with diabetes 03/27/2018    BMI 29.0-29.9,adult 03/27/2018    Hyperlipidemia associated with type 2 diabetes mellitus 03/24/2017    Osteoarthritis 02/08/2016    Hypothyroidism 02/10/2015    Posterior vitreous detachment, right eye 07/07/2014    Floater, vitreous 07/07/2014    Vitreous detachment 07/07/2014    Pseudophakia 07/07/2014    Refractive error 07/07/2014    Type 2 diabetes mellitus with diabetic polyneuropathy, without long-term current use of insulin 02/27/2013    Hyperlipidemia 02/27/2013    Prominent aorta 08/13/2010     Seen on chest x-ray dated 08/13/10         Patient's Medications   New Prescriptions    ROSUVASTATIN  (CRESTOR) 20 MG TABLET    Take 1 tablet (20 mg total) by mouth once daily.   Previous Medications    ACCU-CHEK NAYA PLUS TEST STRP STRP    CHECK BLOOD SUGAR DAILY AS DIRECTED    ASPIRIN 81 MG CHEW    Take 1 tablet (81 mg total) by mouth once daily.    AZELASTINE (ASTELIN) 137 MCG (0.1 %) NASAL SPRAY    1 spray (137 mcg total) by Nasal route 2 (two) times daily.    BENZONATATE (TESSALON) 200 MG CAPSULE    Take 1 capsule (200 mg total) by mouth 3 (three) times daily as needed for Cough.    BLOOD-GLUCOSE METER MISC    1 device.  Check sugar 1 times daily as directed by MD. Supply preferred brand .Dx Code: [E11.8]    DULOXETINE (CYMBALTA) 60 MG CAPSULE    Take 1 capsule (60 mg total) by mouth once daily.    ESOMEPRAZOLE (NEXIUM) 40 MG CAPSULE    Take 1 capsule (40 mg total) by mouth daily with dinner or evening meal.    FAMOTIDINE (PEPCID) 40 MG TABLET    Take 1 tablet (40 mg total) by mouth daily with dinner or evening meal.    FINASTERIDE (PROSCAR) 5 MG TABLET    Take 1 tablet (5 mg total) by mouth once daily.    LANCETS MISC    1 box.  Check 1x daily as directed by MD. Supply brand covered by insurance. Dx Code: [E11.8]. Accucheck Naya.    LEVOTHYROXINE (SYNTHROID) 88 MCG TABLET    Take 1 tablet (88 mcg total) by mouth before breakfast.    OLMESARTAN (BENICAR) 20 MG TABLET    Take 1 tablet (20 mg total) by mouth once daily.    TAMSULOSIN (FLOMAX) 0.4 MG CAP    Take 1 capsule (0.4 mg total) by mouth once daily.    TRAZODONE (DESYREL) 100 MG TABLET    Take 1 tablet (100 mg total) by mouth every evening.   Modified Medications    No medications on file   Discontinued Medications    PRAVASTATIN (PRAVACHOL) 20 MG TABLET    Take 1 tablet (20 mg total) by mouth every evening.        Review of Systems   Constitutional: Positive for malaise/fatigue. Negative for chills, decreased appetite, diaphoresis, weight gain and weight loss.   Cardiovascular:  Positive for dyspnea on exertion and near-syncope. Negative for chest pain,  claudication, irregular heartbeat, leg swelling, orthopnea, palpitations, paroxysmal nocturnal dyspnea and syncope.   Respiratory:  Positive for cough. Negative for hemoptysis, shortness of breath and snoring.    Gastrointestinal:  Negative for bloating, abdominal pain, nausea and vomiting.   Neurological:  Negative for light-headedness and weakness.     Family History   Problem Relation Age of Onset    Osteoporosis Mother     Hypertension Mother     Early death Father         Heart,  Stroke age 50    Stroke Father     Heart disease Father     Hypertension Father     Heart disease Brother     Early death Brother         Heart Att.  age 45    Hypertension Brother     No Known Problems Daughter     No Known Problems Son     Early death Paternal Uncle         Heart,  age 49    Heart disease Paternal Uncle        Social History     Socioeconomic History    Marital status:    Tobacco Use    Smoking status: Never     Passive exposure: Never    Smokeless tobacco: Never   Substance and Sexual Activity    Alcohol use: Never     Comment: occasionally (maybe every 3 months)    Drug use: Never    Sexual activity: Not Currently     Partners: Female     Birth control/protection: None   Social History Narrative    9/7/2023: he lives alone. He has 2 kids, they live in [place] and [place]. 7 grandchildren. 2 grandchildren live nearby. No pets at home. He is retired. 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      Social Determinants of Health     Financial Resource Strain: Low Risk  (2/29/2024)    Overall Financial Resource Strain (CARDIA)     Difficulty of Paying Living Expenses: Not very hard   Food Insecurity: No Food Insecurity (2/29/2024)    Hunger Vital Sign     Worried About Running Out of Food in the Last Year: Never true     Ran Out of Food in the Last Year: Never true   Transportation Needs: No Transportation Needs (2/29/2024)    PRAPARE - Transportation     Lack of Transportation (Medical): No     Lack  "of Transportation (Non-Medical): No   Physical Activity: Insufficiently Active (2/29/2024)    Exercise Vital Sign     Days of Exercise per Week: 3 days     Minutes of Exercise per Session: 30 min   Stress: No Stress Concern Present (2/29/2024)    Citizen of [place] [employer] - Occupational Stress Questionnaire     Feeling of Stress : Only a little   Social Connections: Unknown (2/29/2024)    Social Connection and Isolation Panel [NHANES]     Frequency of Communication with Friends and Family: Once a week     Frequency of Social Gatherings with Friends and Family: Once a week     Active Member of Clubs or Organizations: Yes     Attends Club or Organization Meetings: 1 to 4 times per year     Marital Status:    Housing Stability: Low Risk  (2/29/2024)    Housing Stability Vital Sign     Unable to Pay for Housing in the Last Year: No     Number of Places Lived in the Last Year: 1     Unstable Housing in the Last Year: No            Objective:   Vitals  Vitals:    04/10/24 1053 04/10/24 1056   BP: 115/76 117/77   Pulse: 90    SpO2: 99%    Weight: 83.5 kg (184 lb 1.4 oz)    Height: 5' 7" (1.702 m)           Physical Exam  Vitals and nursing note reviewed.   Constitutional:       Appearance: Normal appearance.   Cardiovascular:      Rate and Rhythm: Normal rate and regular rhythm.      Pulses:           Dorsalis pedis pulses are detected w/ Doppler on the right side and detected w/ Doppler on the left side.        Posterior tibial pulses are detected w/ Doppler on the right side and detected w/ Doppler on the left side.      Heart sounds: Murmur heard.      No gallop.   Pulmonary:      Effort: Pulmonary effort is normal.      Breath sounds: Normal breath sounds.   Abdominal:      General: Bowel sounds are normal. There is no distension.      Palpations: Abdomen is soft.      Tenderness: There is no abdominal tenderness.   Skin:     General: Skin is warm and dry.   Neurological:      Mental Status: He " is alert and oriented to person, place, and time.       Lab Results    Lab Results   Component Value Date    WBC 7.39 03/07/2024    HGB 15.0 03/07/2024    HCT 45.6 03/07/2024    MCV 90 03/07/2024       Lab Results   Component Value Date     03/07/2024    INR 1.0 11/20/2015       Lab Results   Component Value Date    K 4.4 03/22/2024    BUN 27 (H) 03/22/2024    CREATININE 1.1 03/22/2024       Lab Results   Component Value Date     (H) 03/22/2024    HGBA1C 6.0 (H) 03/07/2024       Lab Results   Component Value Date    AST 14 03/07/2024    ALT 14 03/07/2024    ALBUMIN 4.0 03/07/2024    PROT 7.4 03/07/2024       Lab Results   Component Value Date    CHOL 135 03/07/2024    HDL 37 (L) 03/07/2024    LDLCALC 86.2 03/07/2024    TRIG 59 03/07/2024       Lab Results   Component Value Date    CRP 0.6 08/11/2023    BNP <10 08/11/2023       Assessment:     1. Nonrheumatic aortic valve stenosis    2. Coronary artery disease of native artery of native heart with stable angina pectoris    3. PAD (peripheral artery disease)    4. Hyperlipidemia associated with type 2 diabetes mellitus    5. Hypertension associated with diabetes    6. Other hyperlipidemia        Plan:     AS  -noted on Echo 4/3/2024 as above  -given concerning symptoms, will plan for RHC with Dr. Vickers for evaluation    2. CAD  -noted on CT 3/14/2024  -given concerning symptoms, will plan for LHC with Dr. Vickers for eval  -continue asa  -tight lipid control, goal LDLc < 70  -will change from pravastatin to rosuvastatin 20    3. PAD  -cotninue asa, statin as above    4. HLD  -goal LDLc < 70  -recheck lipid panel, LFTs at F/U in 3m    5. HTN  -well controlled, continue medication regimen as above        -Plan for L/RHC per Dr. Vickers for evaluation of AS, CAD given concerning symptomology  -F/U 3m        No orders of the defined types were placed in this encounter.      He expressed verbal understanding and agreed with the plan    Pertinent cardiac images  and EKG reviewed independently.    Continue with current medical plan and lifestyle changes.  Return sooner for concerns or questions. If symptoms persist go to the ED.  I have reviewed all pertinent data including patient's medical history in detail and updated the computerized patient record.     Counseling included discussion regarding imaging findings, diagnosis, possibilities, treatment options, risks and benefits.    Thank you for the opportunity to care for this patient. Will be available for questions if needed.     Discussed with Dr. Vickers . RTC in 3 months.      Signed:  Calvin Boothe DNP  04/10/2024        24 yo male presents with R hand pain s/p punching a wall with swellign, and pain. XRAY, R/o fracture. -Skip Elliott PA-C 24 yo male presents with R hand pain s/p punching a wall with swelling, and pain. XRAY, R/o fracture. -Skip Elliott PA-C

## 2024-05-16 ENCOUNTER — EMERGENCY (EMERGENCY)
Facility: HOSPITAL | Age: 29
LOS: 0 days | Discharge: HOME | End: 2024-05-17
Attending: EMERGENCY MEDICINE
Payer: MEDICAID

## 2024-05-16 VITALS — HEIGHT: 71 IN

## 2024-05-16 DIAGNOSIS — Z98.890 OTHER SPECIFIED POSTPROCEDURAL STATES: Chronic | ICD-10-CM

## 2024-05-16 DIAGNOSIS — Z91.030 BEE ALLERGY STATUS: ICD-10-CM

## 2024-05-16 DIAGNOSIS — R42 DIZZINESS AND GIDDINESS: ICD-10-CM

## 2024-05-16 DIAGNOSIS — G40.909 EPILEPSY, UNSPECIFIED, NOT INTRACTABLE, WITHOUT STATUS EPILEPTICUS: ICD-10-CM

## 2024-05-16 DIAGNOSIS — R11.0 NAUSEA: ICD-10-CM

## 2024-05-16 DIAGNOSIS — Z88.2 ALLERGY STATUS TO SULFONAMIDES: ICD-10-CM

## 2024-05-16 DIAGNOSIS — R55 SYNCOPE AND COLLAPSE: ICD-10-CM

## 2024-05-16 DIAGNOSIS — F98.8 OTHER SPECIFIED BEHAVIORAL AND EMOTIONAL DISORDERS WITH ONSET USUALLY OCCURRING IN CHILDHOOD AND ADOLESCENCE: ICD-10-CM

## 2024-05-16 LAB
ALBUMIN SERPL ELPH-MCNC: 5.3 G/DL — HIGH (ref 3.3–5)
ALP SERPL-CCNC: 63 U/L — SIGNIFICANT CHANGE UP (ref 40–120)
ALT FLD-CCNC: 56 U/L — SIGNIFICANT CHANGE UP (ref 12–78)
ANION GAP SERPL CALC-SCNC: 13 MMOL/L — SIGNIFICANT CHANGE UP (ref 5–17)
APPEARANCE UR: CLEAR — SIGNIFICANT CHANGE UP
APTT BLD: 31.2 SEC — SIGNIFICANT CHANGE UP (ref 24.5–35.6)
AST SERPL-CCNC: 24 U/L — SIGNIFICANT CHANGE UP (ref 15–37)
BASOPHILS # BLD AUTO: 0.05 K/UL — SIGNIFICANT CHANGE UP (ref 0–0.2)
BASOPHILS NFR BLD AUTO: 0.7 % — SIGNIFICANT CHANGE UP (ref 0–2)
BILIRUB SERPL-MCNC: 1.2 MG/DL — SIGNIFICANT CHANGE UP (ref 0.2–1.2)
BILIRUB UR-MCNC: NEGATIVE — SIGNIFICANT CHANGE UP
BUN SERPL-MCNC: 8 MG/DL — SIGNIFICANT CHANGE UP (ref 7–23)
CALCIUM SERPL-MCNC: 10.5 MG/DL — HIGH (ref 8.5–10.1)
CHLORIDE SERPL-SCNC: 103 MMOL/L — SIGNIFICANT CHANGE UP (ref 96–108)
CO2 SERPL-SCNC: 24 MMOL/L — SIGNIFICANT CHANGE UP (ref 22–31)
COLOR SPEC: YELLOW — SIGNIFICANT CHANGE UP
CREAT SERPL-MCNC: 0.93 MG/DL — SIGNIFICANT CHANGE UP (ref 0.5–1.3)
DIFF PNL FLD: NEGATIVE — SIGNIFICANT CHANGE UP
EGFR: 115 ML/MIN/1.73M2 — SIGNIFICANT CHANGE UP
EOSINOPHIL # BLD AUTO: 0 K/UL — SIGNIFICANT CHANGE UP (ref 0–0.5)
EOSINOPHIL NFR BLD AUTO: 0 % — SIGNIFICANT CHANGE UP (ref 0–6)
GLUCOSE SERPL-MCNC: 107 MG/DL — HIGH (ref 70–99)
GLUCOSE UR QL: NEGATIVE MG/DL — SIGNIFICANT CHANGE UP
HCT VFR BLD CALC: 46.6 % — SIGNIFICANT CHANGE UP (ref 39–50)
HGB BLD-MCNC: 16.7 G/DL — SIGNIFICANT CHANGE UP (ref 13–17)
IMM GRANULOCYTES NFR BLD AUTO: 0.4 % — SIGNIFICANT CHANGE UP (ref 0–0.9)
INR BLD: 0.95 RATIO — SIGNIFICANT CHANGE UP (ref 0.85–1.18)
KETONES UR-MCNC: 80 MG/DL
LACTATE SERPL-SCNC: 1.2 MMOL/L — SIGNIFICANT CHANGE UP (ref 0.7–2)
LEUKOCYTE ESTERASE UR-ACNC: NEGATIVE — SIGNIFICANT CHANGE UP
LIDOCAIN IGE QN: 16 U/L — SIGNIFICANT CHANGE UP (ref 13–75)
LYMPHOCYTES # BLD AUTO: 1.17 K/UL — SIGNIFICANT CHANGE UP (ref 1–3.3)
LYMPHOCYTES # BLD AUTO: 16.2 % — SIGNIFICANT CHANGE UP (ref 13–44)
MCHC RBC-ENTMCNC: 30.3 PG — SIGNIFICANT CHANGE UP (ref 27–34)
MCHC RBC-ENTMCNC: 35.8 GM/DL — SIGNIFICANT CHANGE UP (ref 32–36)
MCV RBC AUTO: 84.4 FL — SIGNIFICANT CHANGE UP (ref 80–100)
MONOCYTES # BLD AUTO: 0.54 K/UL — SIGNIFICANT CHANGE UP (ref 0–0.9)
MONOCYTES NFR BLD AUTO: 7.5 % — SIGNIFICANT CHANGE UP (ref 2–14)
NEUTROPHILS # BLD AUTO: 5.44 K/UL — SIGNIFICANT CHANGE UP (ref 1.8–7.4)
NEUTROPHILS NFR BLD AUTO: 75.2 % — SIGNIFICANT CHANGE UP (ref 43–77)
NITRITE UR-MCNC: NEGATIVE — SIGNIFICANT CHANGE UP
PH UR: 6 — SIGNIFICANT CHANGE UP (ref 5–8)
PLATELET # BLD AUTO: 286 K/UL — SIGNIFICANT CHANGE UP (ref 150–400)
POTASSIUM SERPL-MCNC: 3.5 MMOL/L — SIGNIFICANT CHANGE UP (ref 3.5–5.3)
POTASSIUM SERPL-SCNC: 3.5 MMOL/L — SIGNIFICANT CHANGE UP (ref 3.5–5.3)
PROT SERPL-MCNC: 8.6 GM/DL — HIGH (ref 6–8.3)
PROT UR-MCNC: NEGATIVE MG/DL — SIGNIFICANT CHANGE UP
PROTHROM AB SERPL-ACNC: 10.8 SEC — SIGNIFICANT CHANGE UP (ref 9.5–13)
RBC # BLD: 5.52 M/UL — SIGNIFICANT CHANGE UP (ref 4.2–5.8)
RBC # FLD: 12.1 % — SIGNIFICANT CHANGE UP (ref 10.3–14.5)
SODIUM SERPL-SCNC: 140 MMOL/L — SIGNIFICANT CHANGE UP (ref 135–145)
SP GR SPEC: 1.01 — SIGNIFICANT CHANGE UP (ref 1–1.03)
UROBILINOGEN FLD QL: 0.2 MG/DL — SIGNIFICANT CHANGE UP (ref 0.2–1)
WBC # BLD: 7.23 K/UL — SIGNIFICANT CHANGE UP (ref 3.8–10.5)
WBC # FLD AUTO: 7.23 K/UL — SIGNIFICANT CHANGE UP (ref 3.8–10.5)

## 2024-05-16 PROCEDURE — 83605 ASSAY OF LACTIC ACID: CPT

## 2024-05-16 PROCEDURE — 70450 CT HEAD/BRAIN W/O DYE: CPT | Mod: 26,MC

## 2024-05-16 PROCEDURE — 93010 ELECTROCARDIOGRAM REPORT: CPT

## 2024-05-16 PROCEDURE — 87086 URINE CULTURE/COLONY COUNT: CPT

## 2024-05-16 PROCEDURE — 36415 COLL VENOUS BLD VENIPUNCTURE: CPT

## 2024-05-16 PROCEDURE — 80053 COMPREHEN METABOLIC PANEL: CPT

## 2024-05-16 PROCEDURE — 99285 EMERGENCY DEPT VISIT HI MDM: CPT | Mod: 25

## 2024-05-16 PROCEDURE — 99285 EMERGENCY DEPT VISIT HI MDM: CPT

## 2024-05-16 PROCEDURE — 85025 COMPLETE CBC W/AUTO DIFF WBC: CPT

## 2024-05-16 PROCEDURE — 85730 THROMBOPLASTIN TIME PARTIAL: CPT

## 2024-05-16 PROCEDURE — 70450 CT HEAD/BRAIN W/O DYE: CPT | Mod: MC

## 2024-05-16 PROCEDURE — 96374 THER/PROPH/DIAG INJ IV PUSH: CPT | Mod: XU

## 2024-05-16 PROCEDURE — 71045 X-RAY EXAM CHEST 1 VIEW: CPT | Mod: 26

## 2024-05-16 PROCEDURE — 96376 TX/PRO/DX INJ SAME DRUG ADON: CPT

## 2024-05-16 PROCEDURE — 81003 URINALYSIS AUTO W/O SCOPE: CPT

## 2024-05-16 PROCEDURE — 83690 ASSAY OF LIPASE: CPT

## 2024-05-16 PROCEDURE — 96375 TX/PRO/DX INJ NEW DRUG ADDON: CPT

## 2024-05-16 PROCEDURE — 87040 BLOOD CULTURE FOR BACTERIA: CPT

## 2024-05-16 PROCEDURE — 85610 PROTHROMBIN TIME: CPT

## 2024-05-16 PROCEDURE — 93005 ELECTROCARDIOGRAM TRACING: CPT

## 2024-05-16 PROCEDURE — 71045 X-RAY EXAM CHEST 1 VIEW: CPT

## 2024-05-16 RX ORDER — ONDANSETRON 8 MG/1
4 TABLET, FILM COATED ORAL ONCE
Refills: 0 | Status: COMPLETED | OUTPATIENT
Start: 2024-05-16 | End: 2024-05-16

## 2024-05-16 RX ORDER — LEVETIRACETAM 250 MG/1
500 TABLET, FILM COATED ORAL ONCE
Refills: 0 | Status: DISCONTINUED | OUTPATIENT
Start: 2024-05-16 | End: 2024-05-16

## 2024-05-16 RX ORDER — SODIUM CHLORIDE 9 MG/ML
1650 INJECTION INTRAMUSCULAR; INTRAVENOUS; SUBCUTANEOUS ONCE
Refills: 0 | Status: COMPLETED | OUTPATIENT
Start: 2024-05-16 | End: 2024-05-16

## 2024-05-16 RX ORDER — DIAZEPAM 5 MG
0.5 TABLET ORAL ONCE
Refills: 0 | Status: DISCONTINUED | OUTPATIENT
Start: 2024-05-16 | End: 2024-05-16

## 2024-05-16 RX ADMIN — ONDANSETRON 4 MILLIGRAM(S): 8 TABLET, FILM COATED ORAL at 22:58

## 2024-05-16 RX ADMIN — LEVETIRACETAM 500 MILLIGRAM(S): 250 TABLET, FILM COATED ORAL at 20:33

## 2024-05-16 RX ADMIN — ONDANSETRON 4 MILLIGRAM(S): 8 TABLET, FILM COATED ORAL at 18:49

## 2024-05-16 RX ADMIN — Medication 0.5 MILLIGRAM(S): at 20:33

## 2024-05-16 RX ADMIN — SODIUM CHLORIDE 1650 MILLILITER(S): 9 INJECTION INTRAMUSCULAR; INTRAVENOUS; SUBCUTANEOUS at 18:49

## 2024-05-16 NOTE — ED PROVIDER NOTE - OBJECTIVE STATEMENT
29 year old male with PMH of ADD, seizure disorders on keppra, pinched nerve, presents with cc of feeling dizzy lightheaded, and nauseated. Denies abdominal pain. No melena or hematochezia. No visual or focal neurological complaints. No recent trauma. States took his dose of 500 mg of Keppra by mouth last night but not today. No seizure like activities today. No syncope. No weakness in arms or legs. No melena or hematochezia. No dysuria hematuria or frequency. No neck pain or stiffness. No recent trauma. Ambulatory. No saddle anesthesia. No incontinence of urine or stool. No back pain.

## 2024-05-16 NOTE — ED PROVIDER NOTE - ATTENDING APP SHARED VISIT CONTRIBUTION OF CARE
I Sage Enriquez MD saw and examined the patient. MLP saw and examined the patient under my supervision. I discussed the care of the patient with MLP and agree with MLP's plan, assessment and care of the patient while in the ED.

## 2024-05-16 NOTE — ED PROVIDER NOTE - PROGRESS NOTE DETAILS
Mina PISANO: ER observation unremarkable, patient with no abdominal pain, no vomiting in ER, tolerating PO, s/p iv dose of keppra, labs and imaging including CT of the head and x-ray reviewed. Offered patient cat scan imaging of the abdomen but given that patient has no pain he wishes to go home. Follow up with neurology, and gastroenterology provided. Strict return precautions if any worsening of the symptoms provided.

## 2024-05-16 NOTE — ED ADULT TRIAGE NOTE - CHIEF COMPLAINT QUOTE
Pt presents to the ED c/o n/v/d since last night. Pt unable to tolerate PO intake. Denies abdominal pain or fevers. PMH of seizure disorder.

## 2024-05-16 NOTE — ED PROVIDER NOTE - RESPIRATORY, MLM
Patient is still maintaining airway on multiple antibiotics waiting for any cultures her PCR on spinal fluid continue broad-spectrum antibiotics including acyclovir    Breath sounds clear and equal bilaterally. No retractions or tachypnea.

## 2024-05-16 NOTE — ED PROVIDER NOTE - NSFOLLOWUPINSTRUCTIONS_ED_ALL_ED_FT
Please note that I have provided you with the results of your labs and imaging including the report of the CAT scan imaging of the head, and my x-ray impression.    Please note that I have reviewed your x-ray imaging. I have provided you with my impression. It will often take 24 to 48 hours to get the official reports of the x-ray. Please either access the x-ray reports real time by using the link provided here for patient portal access or contact the radiology number or alternatively please contact your primary care provider to get the results of the x-ray for you.     While CAT scan or CT scan imaging is an ideal imaging in the emergent setting, at times it is not the most sensitive in diagnosis of all disease process. Please note that you have been provided written copies of the imaging and that you can access real time reports using the patient portal link. You can also have your primary care provider or your specialist, pull up the real time reports or review images. All CT type images are read by our radiology specialist and ER defers to their expertise on findings. As stated there are certain pathologies that can be missed even with the best attention to detail, due to the inherent limitation of the CAT scan imaging such as ulcers, small masses or tumors or even certain cancers. Also note that most if not all CAT scan imaging can find incidental findings. Incidental findings are reported findings that are not the main goal behind doing the CAT scan but are details that the radiologist sees that he feels the patient should know about. Generally most incidental findings are benign but NOT always. So please make sure you report all incidental findings to your primary care provider and your specialist, as they might need further imaging or testing to rule out certain conditions. You can potentially need other imaging.    Please note that I offered you a CAT scan imaging of your abdomen, but you have no pain, no vomiting in ER, and you tolerated food, please return to us immediately if you develop any abdominal pain, vomiting again, or if any blood in stool, as we might have to consider imaging then.    Please follow up with your neurologist as soon as possible, if you do not have one then I have provided you with the contact number for one. For all other health concerns return to us immediately.

## 2024-05-16 NOTE — ED PROVIDER NOTE - PATIENT PORTAL LINK FT
You can access the FollowMyHealth Patient Portal offered by [facility] by registering at the following website: [email or web address]. By joining [facility]’s FollowMyHealth portal, you will also be able to view your health information using other applications (apps) compatible with our system.

## 2024-05-16 NOTE — ED PROVIDER NOTE - CARE PROVIDER_API CALL
Jermain Jennings  Gastroenterology  [address] [place] [id number]  Phone: [phone]  Fax: [phone]  Follow Up Time:

## 2024-05-16 NOTE — ED ADULT NURSE NOTE - OBJECTIVE STATEMENT
Pt presents to the ED c/o vomiting. Pt reports vomiting, nausea, and diarrhea since last night. Pt has a hx of seizures. Pt's mom reports he threw up his medications last night and could not keep anything down. Pt denies fever, SOB at this time.

## 2024-05-16 NOTE — ED ADULT TRIAGE NOTE - INTERNATIONAL TRAVEL
SUBJECTIVE: Jose Martin Granda is a 40 y.o. male seen for a follow up visit;    S/p gastric bypass: he had gastric bypass > 10 years ago.  Has has chronic absorptive issues w/ iron and now B12 but now having trouble swallowing as well.  Food sticks in the upper part of his throat when he swallows and he feels like he's choking.  No vomiting or reflux really, just food sticking.  He hasn't seen a gastric bypass doc here.  No melena, no hematachezia, but he has had iron infusions in the past due to chronic iron deficiency.  He has been scoped before and had strictures.        The following portions of the patient's history were reviewed and updated as appropriate: He  has a past medical history of Allergic (12/4/04); Erectile dysfunction; Essential hypertension; Hyperlipidemia; and Iron deficiency anemia.  He has Iron deficiency anemia; Gastric bypass status for obesity; Chronic fatigue; Vitamin D deficiency; Psychophysiological insomnia; B12 deficiency; and Low testosterone in male on his pertinent problem list.  He  has a past surgical history that includes Tonsillectomy; Bariatric Surgery (12/1/04); and Jasmyne-en-y procedure (12/01/2004).  His family history includes Breast cancer in his mother; Breast cancer (age of onset: 50) in his maternal grandmother; Cancer in his father, maternal grandfather, maternal grandmother, mother, and paternal grandmother; Heart disease in his father, mother, and paternal grandmother; Hyperlipidemia in his father, maternal uncle, mother, and paternal grandmother; Hypertension in his father, maternal uncle, mother, and paternal grandmother; Lung cancer in his paternal grandmother; Prostate cancer (age of onset: 55) in his father and maternal grandfather.  He  reports that he has never smoked. He has never used smokeless tobacco. He reports that he drinks alcohol. He reports that he does not use drugs.  He has a current medication list which includes the following prescription(s): azelastine,  "ketoconazole, ketoconazole, trazodone, anastrozole, and vitamin d, and the following Facility-Administered Medications: cyanocobalamin..    Review of Systems   Constitutional: Positive for fatigue.   HENT: Positive for trouble swallowing.    Respiratory: Negative.    Cardiovascular: Negative.    Psychiatric/Behavioral: Positive for dysphoric mood.         OBJECTIVE:  /70   Pulse 66   Temp 98.1 °F (36.7 °C)   Ht 188 cm (74\")   Wt 88.6 kg (195 lb 6 oz)   SpO2 97%   BMI 25.08 kg/m²      Physical Exam   Constitutional: He appears well-developed and well-nourished. No distress.       No visits with results within 1 Day(s) from this visit.   Latest known visit with results is:   Office Visit on 04/17/2018   Component Date Value Ref Range Status   • WBC 04/17/2018 3.92* 4.80 - 10.80 10*3/mm3 Final   • RBC 04/17/2018 4.94  4.70 - 6.10 10*6/mm3 Final   • Hemoglobin 04/17/2018 15.5  14.0 - 18.0 g/dL Final   • Hematocrit 04/17/2018 45.7  42.0 - 52.0 % Final   • MCV 04/17/2018 92.5  80.0 - 94.0 fL Final   • MCH 04/17/2018 31.4* 27.0 - 31.0 pg Final   • MCHC 04/17/2018 33.9  30.0 - 37.0 g/dL Final   • RDW 04/17/2018 12.6  11.5 - 14.5 % Final   • Platelets 04/17/2018 221  130 - 400 10*3/mm3 Final   • Ferritin 04/17/2018 32.10  17.90 - 464.00 ng/mL Final   • TIBC 04/17/2018 367  261 - 497 mcg/dL Final   • UIBC 04/17/2018 192  mcg/dL Final   • Iron 04/17/2018 175  37 - 181 mcg/dL Final   • Iron Saturation 04/17/2018 48* 11 - 46 % Final   • Vitamin B-12 04/17/2018 278  239 - 931 pg/mL Final   • Estradiol 04/17/2018 41.3  7.6 - 42.6 pg/mL Final    Roche ECLIA methodology   • Testosterone, Total 04/17/2018 680  264 - 916 ng/dL Final    Comment: Adult male reference interval is based on a population of  healthy nonobese males (BMI <30) between 19 and 39 years old.  Carlos et.al. JCEM 2017,102;4199-0162. PMID: 65172788.     • Testosterone, Free 04/17/2018 11.7  8.7 - 25.1 pg/mL Final   • Glucose 04/17/2018 88  74 - 98 " mg/dL Final   • BUN 04/17/2018 9  7 - 20 mg/dL Final   • Creatinine 04/17/2018 0.70  0.60 - 1.30 mg/dL Final   • eGFR Non African Am 04/17/2018 126  >60 mL/min/1.73 Final    Comment: Abnormal estimated GFR should be followed by more specific  studies to confirm end stage chronic renal disease. The  equation used for calculation may not be accurate for  patients less than 19 years old, greater than 70 years old,  patients at extremes of weight, malnutrition, or with acute  renal dysfunction.     • eGFR  Am 04/17/2018 >150  >60 mL/min/1.73 Final   • BUN/Creatinine Ratio 04/17/2018 12.9  6.3 - 21.9 Final   • Sodium 04/17/2018 142  137 - 145 mmol/L Final   • Potassium 04/17/2018 4.4  3.5 - 5.1 mmol/L Final   • Chloride 04/17/2018 101  98 - 107 mmol/L Final   • Total CO2 04/17/2018 30.0  26.0 - 30.0 mmol/L Final   • Calcium 04/17/2018 10.0  8.4 - 10.2 mg/dL Final   • Total Protein 04/17/2018 7.1  6.3 - 8.2 g/dL Final   • Albumin 04/17/2018 4.40  3.50 - 5.00 g/dL Final   • Globulin 04/17/2018 2.7  gm/dL Final   • A/G Ratio 04/17/2018 1.6  1.0 - 2.0 g/dL Final   • Total Bilirubin 04/17/2018 2.2* 0.2 - 1.3 mg/dL Final   • Alkaline Phosphatase 04/17/2018 69  38 - 126 U/L Final   • AST (SGOT) 04/17/2018 22  15 - 46 U/L Final   • ALT (SGPT) 04/17/2018 32  13 - 69 U/L Final   • 25 Hydroxy, Vitamin D 04/17/2018 <12.8  ng/ml Final    Comment: Interpretation                        ng/mL  ______________                      ________  Deficient                               <20  Insufficient                         20-29  Sufficient                             Potential Toxicity                     >100  As per:. Stanley MF, Lenny NC, Anne GENAO, et al.  Evaluation, treatment, and prevention of vitamin D  deficiency:  an Endocrine Society clinical practice  guideline. JCEM. 2011 Jul; 96(7):1911-30.     • T4, Total 04/17/2018 6.0  4.5 - 12.0 ug/dL Final   • T3, Free 04/17/2018 3.4  2.0 - 4.4 pg/mL Final   • TSH  04/17/2018 1.340  0.470 - 4.680 mIU/mL Final   • Folate 04/17/2018 9.37  >2.76 ng/mL Final   ]    ASSESSMENT:   Diagnosis Plan   1. B12 deficiency  cyanocobalamin injection 1,000 mcg   2. Vitamin D deficiency  vitamin D (ERGOCALCIFEROL) 47861 units capsule capsule   3. Estrogen increased  anastrozole (ARIMIDEX) 1 MG tablet   4. Gastric bypass status for obesity  FL esophagram complete   5. Food sticks on swallowing  FL esophagram complete   6. Sensation, choking  FL esophagram complete       Maybe able to refer to GI rather than bariatrics depending on esophagram results.          I, Elaine Torrez MD, hereby attest that the medical record entry above accurately reflects signatures/notations that I made in my capacity as Elaine Torrez MD when I treated and diagnosed the above patient.  I do hereby attest that his information is true, accurate, and complete to the best of my knowledge and I understand that any falsification, omission, or concealment of material fact may subject me to administrative, civil, or criminal liability.               No

## 2024-05-16 NOTE — ED ADULT NURSE REASSESSMENT NOTE - NS ED NURSE REASSESS COMMENT FT1
Pt ambulated to bathroom. Pt tolerated ambulation well. No acute distress noted. Safety and comfort maintained.
Pt reports improvement in nausea. Pt has no complaints at this time. NAD noted. Pt and family updated on plan of care. Safety and comfort maintained.
pt care assumed from previous RNKarla. pt is a&ox4, VS stable. Pt has no complaints at this time. Family at bedside. Pt updated on POC, no acute signs of distress.

## 2024-05-16 NOTE — ED STATDOCS - PROGRESS NOTE DETAILS
29-year-old male history of epilepsy presents the emergency department for nausea vomiting diarrhea patient states he feels extremely dehydrated has not been able to take his seizure medications in 2 days states that he is starting to feel shaky and lightheaded last seizure was a few years ago but is concerned that he might have a seizure given the fact that he has not had his medications will send to main for further workup and evaluation. Jermain Mcdaniel M.D., Attending Physician

## 2024-05-17 VITALS
RESPIRATION RATE: 16 BRPM | TEMPERATURE: 98 F | OXYGEN SATURATION: 100 % | SYSTOLIC BLOOD PRESSURE: 133 MMHG | DIASTOLIC BLOOD PRESSURE: 86 MMHG | HEART RATE: 90 BPM

## 2024-05-17 LAB
CULTURE RESULTS: SIGNIFICANT CHANGE UP
SPECIMEN SOURCE: SIGNIFICANT CHANGE UP

## 2024-05-22 LAB
CULTURE RESULTS: SIGNIFICANT CHANGE UP
CULTURE RESULTS: SIGNIFICANT CHANGE UP
SPECIMEN SOURCE: SIGNIFICANT CHANGE UP
SPECIMEN SOURCE: SIGNIFICANT CHANGE UP

## 2024-12-07 ENCOUNTER — EMERGENCY (EMERGENCY)
Facility: HOSPITAL | Age: 29
LOS: 0 days | Discharge: HOME | End: 2024-12-07
Attending: STUDENT IN AN ORGANIZED HEALTH CARE EDUCATION/TRAINING PROGRAM
Payer: MEDICAID

## 2024-12-07 VITALS
DIASTOLIC BLOOD PRESSURE: 88 MMHG | TEMPERATURE: 98 F | OXYGEN SATURATION: 99 % | HEART RATE: 86 BPM | SYSTOLIC BLOOD PRESSURE: 123 MMHG | RESPIRATION RATE: 18 BRPM

## 2024-12-07 VITALS — WEIGHT: 159.17 LBS

## 2024-12-07 DIAGNOSIS — Z98.890 OTHER SPECIFIED POSTPROCEDURAL STATES: Chronic | ICD-10-CM

## 2024-12-07 DIAGNOSIS — R11.2 NAUSEA WITH VOMITING, UNSPECIFIED: ICD-10-CM

## 2024-12-07 DIAGNOSIS — F41.9 ANXIETY DISORDER, UNSPECIFIED: ICD-10-CM

## 2024-12-07 DIAGNOSIS — K52.9 NONINFECTIVE GASTROENTERITIS AND COLITIS, UNSPECIFIED: ICD-10-CM

## 2024-12-07 DIAGNOSIS — Z91.030 BEE ALLERGY STATUS: ICD-10-CM

## 2024-12-07 DIAGNOSIS — G40.909 EPILEPSY, UNSPECIFIED, NOT INTRACTABLE, WITHOUT STATUS EPILEPTICUS: ICD-10-CM

## 2024-12-07 DIAGNOSIS — Z88.2 ALLERGY STATUS TO SULFONAMIDES: ICD-10-CM

## 2024-12-07 LAB
ALBUMIN SERPL ELPH-MCNC: 4.9 G/DL — SIGNIFICANT CHANGE UP (ref 3.3–5)
ALP SERPL-CCNC: 55 U/L — SIGNIFICANT CHANGE UP (ref 40–120)
ALT FLD-CCNC: 55 U/L — SIGNIFICANT CHANGE UP (ref 12–78)
ANION GAP SERPL CALC-SCNC: 7 MMOL/L — SIGNIFICANT CHANGE UP (ref 5–17)
AST SERPL-CCNC: 32 U/L — SIGNIFICANT CHANGE UP (ref 15–37)
BASOPHILS # BLD AUTO: 0.06 K/UL — SIGNIFICANT CHANGE UP (ref 0–0.2)
BASOPHILS NFR BLD AUTO: 0.7 % — SIGNIFICANT CHANGE UP (ref 0–2)
BILIRUB SERPL-MCNC: 1.2 MG/DL — SIGNIFICANT CHANGE UP (ref 0.2–1.2)
BUN SERPL-MCNC: 10 MG/DL — SIGNIFICANT CHANGE UP (ref 7–23)
CALCIUM SERPL-MCNC: 10 MG/DL — SIGNIFICANT CHANGE UP (ref 8.5–10.1)
CHLORIDE SERPL-SCNC: 105 MMOL/L — SIGNIFICANT CHANGE UP (ref 96–108)
CO2 SERPL-SCNC: 25 MMOL/L — SIGNIFICANT CHANGE UP (ref 22–31)
CREAT SERPL-MCNC: 0.86 MG/DL — SIGNIFICANT CHANGE UP (ref 0.5–1.3)
EGFR: 122 ML/MIN/1.73M2 — SIGNIFICANT CHANGE UP
EGFR: 122 ML/MIN/1.73M2 — SIGNIFICANT CHANGE UP
EOSINOPHIL # BLD AUTO: 0 K/UL — SIGNIFICANT CHANGE UP (ref 0–0.5)
EOSINOPHIL NFR BLD AUTO: 0 % — SIGNIFICANT CHANGE UP (ref 0–6)
FLUAV AG NPH QL: SIGNIFICANT CHANGE UP
FLUBV AG NPH QL: SIGNIFICANT CHANGE UP
GLUCOSE SERPL-MCNC: 115 MG/DL — HIGH (ref 70–99)
HCT VFR BLD CALC: 46.6 % — SIGNIFICANT CHANGE UP (ref 39–50)
HGB BLD-MCNC: 16.7 G/DL — SIGNIFICANT CHANGE UP (ref 13–17)
IMM GRANULOCYTES NFR BLD AUTO: 0.6 % — SIGNIFICANT CHANGE UP (ref 0–0.9)
LIDOCAIN IGE QN: 16 U/L — SIGNIFICANT CHANGE UP (ref 13–75)
LYMPHOCYTES # BLD AUTO: 1.56 K/UL — SIGNIFICANT CHANGE UP (ref 1–3.3)
LYMPHOCYTES # BLD AUTO: 18.9 % — SIGNIFICANT CHANGE UP (ref 13–44)
MCHC RBC-ENTMCNC: 30.4 PG — SIGNIFICANT CHANGE UP (ref 27–34)
MCHC RBC-ENTMCNC: 35.8 G/DL — SIGNIFICANT CHANGE UP (ref 32–36)
MCV RBC AUTO: 84.9 FL — SIGNIFICANT CHANGE UP (ref 80–100)
MONOCYTES # BLD AUTO: 0.82 K/UL — SIGNIFICANT CHANGE UP (ref 0–0.9)
MONOCYTES NFR BLD AUTO: 9.9 % — SIGNIFICANT CHANGE UP (ref 2–14)
NEUTROPHILS # BLD AUTO: 5.77 K/UL — SIGNIFICANT CHANGE UP (ref 1.8–7.4)
NEUTROPHILS NFR BLD AUTO: 69.9 % — SIGNIFICANT CHANGE UP (ref 43–77)
PLATELET # BLD AUTO: 311 K/UL — SIGNIFICANT CHANGE UP (ref 150–400)
POTASSIUM SERPL-MCNC: 3.4 MMOL/L — LOW (ref 3.5–5.3)
POTASSIUM SERPL-SCNC: 3.4 MMOL/L — LOW (ref 3.5–5.3)
PROT SERPL-MCNC: 8.3 GM/DL — SIGNIFICANT CHANGE UP (ref 6–8.3)
RBC # BLD: 5.49 M/UL — SIGNIFICANT CHANGE UP (ref 4.2–5.8)
RBC # FLD: 12.3 % — SIGNIFICANT CHANGE UP (ref 10.3–14.5)
RSV RNA NPH QL NAA+NON-PROBE: SIGNIFICANT CHANGE UP
SARS-COV-2 RNA SPEC QL NAA+PROBE: SIGNIFICANT CHANGE UP
SODIUM SERPL-SCNC: 137 MMOL/L — SIGNIFICANT CHANGE UP (ref 135–145)
WBC # BLD: 8.26 K/UL — SIGNIFICANT CHANGE UP (ref 3.8–10.5)
WBC # FLD AUTO: 8.26 K/UL — SIGNIFICANT CHANGE UP (ref 3.8–10.5)

## 2024-12-07 PROCEDURE — 99285 EMERGENCY DEPT VISIT HI MDM: CPT

## 2024-12-07 PROCEDURE — 96375 TX/PRO/DX INJ NEW DRUG ADDON: CPT

## 2024-12-07 PROCEDURE — 80053 COMPREHEN METABOLIC PANEL: CPT

## 2024-12-07 PROCEDURE — 74177 CT ABD & PELVIS W/CONTRAST: CPT | Mod: MC

## 2024-12-07 PROCEDURE — 99285 EMERGENCY DEPT VISIT HI MDM: CPT | Mod: 25

## 2024-12-07 PROCEDURE — 96374 THER/PROPH/DIAG INJ IV PUSH: CPT | Mod: XU

## 2024-12-07 PROCEDURE — 83690 ASSAY OF LIPASE: CPT

## 2024-12-07 PROCEDURE — 0241U: CPT

## 2024-12-07 PROCEDURE — 74177 CT ABD & PELVIS W/CONTRAST: CPT | Mod: 26,MC

## 2024-12-07 PROCEDURE — 93010 ELECTROCARDIOGRAM REPORT: CPT

## 2024-12-07 PROCEDURE — 93005 ELECTROCARDIOGRAM TRACING: CPT

## 2024-12-07 PROCEDURE — 85025 COMPLETE CBC W/AUTO DIFF WBC: CPT

## 2024-12-07 PROCEDURE — 36415 COLL VENOUS BLD VENIPUNCTURE: CPT

## 2024-12-07 RX ORDER — METRONIDAZOLE 250 MG
1 TABLET ORAL
Qty: 20 | Refills: 0
Start: 2024-12-07

## 2024-12-07 RX ORDER — MAGNESIUM, ALUMINUM HYDROXIDE 200-200 MG
30 TABLET,CHEWABLE ORAL ONCE
Refills: 0 | Status: COMPLETED | OUTPATIENT
Start: 2024-12-07 | End: 2024-12-07

## 2024-12-07 RX ORDER — DIAZEPAM 5 MG/1
5 TABLET ORAL ONCE
Refills: 0 | Status: DISCONTINUED | OUTPATIENT
Start: 2024-12-07 | End: 2024-12-07

## 2024-12-07 RX ORDER — CIPROFLOXACIN HCL 250 MG
1 TABLET ORAL
Qty: 20 | Refills: 0
Start: 2024-12-07

## 2024-12-07 RX ORDER — ONDANSETRON HCL/PF 4 MG/2 ML
4 VIAL (ML) INJECTION ONCE
Refills: 0 | Status: COMPLETED | OUTPATIENT
Start: 2024-12-07 | End: 2024-12-07

## 2024-12-07 RX ORDER — LEVETIRACETAM 10 MG/ML
500 INJECTION, SOLUTION INTRAVENOUS ONCE
Refills: 0 | Status: DISCONTINUED | OUTPATIENT
Start: 2024-12-07 | End: 2024-12-07

## 2024-12-07 RX ORDER — ONDANSETRON HCL/PF 4 MG/2 ML
1 VIAL (ML) INJECTION
Qty: 30 | Refills: 0
Start: 2024-12-07

## 2024-12-07 RX ORDER — CIPROFLOXACIN HCL 250 MG
500 TABLET ORAL ONCE
Refills: 0 | Status: COMPLETED | OUTPATIENT
Start: 2024-12-07 | End: 2024-12-07

## 2024-12-07 RX ORDER — METRONIDAZOLE 250 MG
500 TABLET ORAL ONCE
Refills: 0 | Status: COMPLETED | OUTPATIENT
Start: 2024-12-07 | End: 2024-12-07

## 2024-12-07 RX ADMIN — Medication 40 MILLIEQUIVALENT(S): at 13:49

## 2024-12-07 RX ADMIN — LEVETIRACETAM 500 MILLIGRAM(S): 10 INJECTION, SOLUTION INTRAVENOUS at 12:48

## 2024-12-07 RX ADMIN — Medication 4 MILLIGRAM(S): at 12:18

## 2024-12-07 RX ADMIN — Medication 500 MILLIGRAM(S): at 13:47

## 2024-12-07 RX ADMIN — Medication 20 MILLIGRAM(S): at 12:19

## 2024-12-07 RX ADMIN — Medication 30 MILLILITER(S): at 12:18

## 2024-12-07 RX ADMIN — Medication 2000 MILLILITER(S): at 12:18

## 2024-12-07 RX ADMIN — DIAZEPAM 5 MILLIGRAM(S): 5 TABLET ORAL at 13:46

## 2025-01-10 NOTE — ED ADULT TRIAGE NOTE - HEIGHT IN INCHES
LABS:                         8.2    10.42 )-----------( 189      ( 10 Mir 2025 08:22 )             27.0     01-10    137  |  100  |  97[H]  ----------------------------<  78  6.3[HH]   |  12[L]  |  11.78[H]    Ca    8.2[L]      10 Mir 2025 08:22  Phos  9.4     01-10  Mg     1.6     01-10    TPro  7.2  /  Alb  3.9  /  TBili  0.3  /  DBili  x   /  AST  25  /  ALT  58[H]  /  AlkPhos  108  01-10    PT/INR - ( 10 Mir 2025 08:24 )   PT: 12.6 sec;   INR: 1.08          PTT - ( 10 Mir 2025 08:24 )  PTT:48.6 sec  Urinalysis Basic - ( 10 Mir 2025 08:22 )    Color: x / Appearance: x / SG: x / pH: x  Gluc: 78 mg/dL / Ketone: x  / Bili: x / Urobili: x   Blood: x / Protein: x / Nitrite: x   Leuk Esterase: x / RBC: x / WBC x   Sq Epi: x / Non Sq Epi: x / Bacteria: x        Lactate, Blood: 1.0 mmol/L (01-10 @ 11:09)      RADIOLOGY, EKG & ADDITIONAL TESTS: Reviewed. 11

## 2025-02-24 NOTE — ED ADULT NURSE NOTE - CAS EDN INTEG ASSESS
Medical Necessity Clause: This procedure was medically necessary because the lesions that were treated were: Consent: The patient's consent was obtained including but not limited to risks of crusting, scabbing, blistering, scarring, darker or lighter pigmentary change, recurrence, incomplete removal and infection. Render Note In Bullet Format When Appropriate: No Medical Necessity Information: It is in your best interest to select a reason for this procedure from the list below. All of these items fulfill various CMS LCD requirements except the new and changing color options. Anesthesia Volume In Cc: 0.5 Treatment Number (Will Not Render If 0): 0 Post-Care Instructions: I reviewed with the patient in detail post-care instructions. Patient is to wear sunprotection, and avoid picking at any of the treated lesions. Pt may apply Vaseline to crusted or scabbing areas. Detail Level: Detailed WDL

## 2025-06-03 NOTE — ED ADULT NURSE NOTE - BREATH SOUNDS, MLM
He would have to discuss other options like eliquis and warfarin with his Electrophysiologist Dr. Simon  They may end up being just as expensive  It looks like xarelto should be going generic this year sometime     Clear